# Patient Record
Sex: MALE | Race: WHITE | Employment: FULL TIME | URBAN - METROPOLITAN AREA
[De-identification: names, ages, dates, MRNs, and addresses within clinical notes are randomized per-mention and may not be internally consistent; named-entity substitution may affect disease eponyms.]

---

## 2018-08-28 ENCOUNTER — EKG ENCOUNTER (OUTPATIENT)
Dept: LAB | Facility: HOSPITAL | Age: 63
End: 2018-08-28
Attending: PLASTIC SURGERY
Payer: COMMERCIAL

## 2018-08-28 DIAGNOSIS — C44.319 BASAL CELL CARCINOMA OF FOREHEAD: Primary | ICD-10-CM

## 2018-08-28 LAB
ANION GAP SERPL CALC-SCNC: 5 MMOL/L (ref 0–18)
ATRIAL RATE: 65 BPM
BASOPHILS # BLD AUTO: 0.08 X10(3) UL (ref 0–0.1)
BASOPHILS NFR BLD AUTO: 1 %
BUN BLD-MCNC: 10 MG/DL (ref 8–20)
BUN/CREAT SERPL: 9.7 (ref 10–20)
CALCIUM BLD-MCNC: 9.1 MG/DL (ref 8.3–10.3)
CHLORIDE SERPL-SCNC: 105 MMOL/L (ref 101–111)
CO2 SERPL-SCNC: 31 MMOL/L (ref 22–32)
CREAT BLD-MCNC: 1.03 MG/DL (ref 0.7–1.3)
EOSINOPHIL # BLD AUTO: 0.79 X10(3) UL (ref 0–0.3)
EOSINOPHIL NFR BLD AUTO: 10.2 %
ERYTHROCYTE [DISTWIDTH] IN BLOOD BY AUTOMATED COUNT: 12.7 % (ref 11.5–16)
GLUCOSE BLD-MCNC: 101 MG/DL (ref 70–99)
HCT VFR BLD AUTO: 43.8 % (ref 37–53)
HGB BLD-MCNC: 15.1 G/DL (ref 13–17)
IMMATURE GRANULOCYTE COUNT: 0.01 X10(3) UL (ref 0–1)
IMMATURE GRANULOCYTE RATIO %: 0.1 %
LYMPHOCYTES # BLD AUTO: 2.69 X10(3) UL (ref 0.9–4)
LYMPHOCYTES NFR BLD AUTO: 34.8 %
MCH RBC QN AUTO: 31.1 PG (ref 27–33.2)
MCHC RBC AUTO-ENTMCNC: 34.5 G/DL (ref 31–37)
MCV RBC AUTO: 90.1 FL (ref 80–99)
MONOCYTES # BLD AUTO: 0.81 X10(3) UL (ref 0.1–1)
MONOCYTES NFR BLD AUTO: 10.5 %
NEUTROPHIL ABS PRELIM: 3.36 X10 (3) UL (ref 1.3–6.7)
NEUTROPHILS # BLD AUTO: 3.36 X10(3) UL (ref 1.3–6.7)
NEUTROPHILS NFR BLD AUTO: 43.4 %
OSMOLALITY SERPL CALC.SUM OF ELEC: 291 MOSM/KG (ref 275–295)
P AXIS: 45 DEGREES
P-R INTERVAL: 182 MS
PLATELET # BLD AUTO: 299 10(3)UL (ref 150–450)
POTASSIUM SERPL-SCNC: 4 MMOL/L (ref 3.6–5.1)
Q-T INTERVAL: 422 MS
QRS DURATION: 126 MS
QTC CALCULATION (BEZET): 438 MS
R AXIS: -15 DEGREES
RBC # BLD AUTO: 4.86 X10(6)UL (ref 4.3–5.7)
RED CELL DISTRIBUTION WIDTH-SD: 41.6 FL (ref 35.1–46.3)
SODIUM SERPL-SCNC: 141 MMOL/L (ref 136–144)
T AXIS: 24 DEGREES
VENTRICULAR RATE: 65 BPM
WBC # BLD AUTO: 7.7 X10(3) UL (ref 4–13)

## 2018-08-28 PROCEDURE — 85025 COMPLETE CBC W/AUTO DIFF WBC: CPT

## 2018-08-28 PROCEDURE — 93010 ELECTROCARDIOGRAM REPORT: CPT | Performed by: INTERNAL MEDICINE

## 2018-08-28 PROCEDURE — 80048 BASIC METABOLIC PNL TOTAL CA: CPT

## 2018-08-28 PROCEDURE — 93005 ELECTROCARDIOGRAM TRACING: CPT

## 2018-08-28 PROCEDURE — 36415 COLL VENOUS BLD VENIPUNCTURE: CPT

## 2019-07-26 ENCOUNTER — OFFICE VISIT (OUTPATIENT)
Dept: FAMILY MEDICINE CLINIC | Facility: CLINIC | Age: 64
End: 2019-07-26
Payer: COMMERCIAL

## 2019-07-26 VITALS
RESPIRATION RATE: 14 BRPM | TEMPERATURE: 98 F | HEART RATE: 62 BPM | BODY MASS INDEX: 29.82 KG/M2 | DIASTOLIC BLOOD PRESSURE: 88 MMHG | HEIGHT: 70.75 IN | WEIGHT: 213 LBS | SYSTOLIC BLOOD PRESSURE: 150 MMHG

## 2019-07-26 DIAGNOSIS — Z00.00 ANNUAL PHYSICAL EXAM: Primary | ICD-10-CM

## 2019-07-26 DIAGNOSIS — I10 ESSENTIAL HYPERTENSION: ICD-10-CM

## 2019-07-26 DIAGNOSIS — Z12.5 SCREENING FOR PROSTATE CANCER: ICD-10-CM

## 2019-07-26 DIAGNOSIS — Z00.00 LABORATORY EXAMINATION ORDERED AS PART OF A ROUTINE GENERAL MEDICAL EXAMINATION: ICD-10-CM

## 2019-07-26 DIAGNOSIS — E78.2 MIXED HYPERLIPIDEMIA: ICD-10-CM

## 2019-07-26 PROCEDURE — 90715 TDAP VACCINE 7 YRS/> IM: CPT | Performed by: FAMILY MEDICINE

## 2019-07-26 PROCEDURE — 99386 PREV VISIT NEW AGE 40-64: CPT | Performed by: FAMILY MEDICINE

## 2019-07-26 PROCEDURE — 90471 IMMUNIZATION ADMIN: CPT | Performed by: FAMILY MEDICINE

## 2019-07-26 NOTE — PROGRESS NOTES
Elena Ruelas is a 59year old male who presents for a complete physical exam.   HPI:   Patient presents with:  Physical      His last annual assessment has been over 1 year: Annual Physical due on 07/24/2014    Last office visit was 2013 and No results found for: A1C, VITD, PSA           No current outpatient medications on file prior to visit. No current facility-administered medications on file prior to visit. Past History:   He does not have any pertinent problems on file.    He  ha Normocephalic and atraumatic.    Right Ear: Tympanic membrane, external ear and ear canal normal.   Left Ear: Tympanic membrane, external ear and ear canal normal.   Nose: Nose normal.   Mouth/Throat: Uvula is midline, oropharynx is clear and moist and muco content normal. Cognition and memory are normal.       ASSESSMENT AND PLAN:   Rasta Sanchez III is a 59year old male who presents for a complete physical exam. Pt's weight is Body mass index is 29.92 kg/m². , recommended low fat diet and aerobic recheck HTN.     Stephany Powers MD, 7/26/2019, 2:56 PM

## 2019-08-21 ENCOUNTER — OFFICE VISIT (OUTPATIENT)
Dept: FAMILY MEDICINE CLINIC | Facility: CLINIC | Age: 64
End: 2019-08-21
Payer: COMMERCIAL

## 2019-08-21 VITALS
DIASTOLIC BLOOD PRESSURE: 90 MMHG | SYSTOLIC BLOOD PRESSURE: 160 MMHG | HEART RATE: 63 BPM | RESPIRATION RATE: 16 BRPM | WEIGHT: 209 LBS | BODY MASS INDEX: 29 KG/M2 | TEMPERATURE: 98 F

## 2019-08-21 DIAGNOSIS — M25.551 CHRONIC PAIN OF BOTH HIPS: ICD-10-CM

## 2019-08-21 DIAGNOSIS — M25.512 CHRONIC PAIN OF BOTH SHOULDERS: Primary | ICD-10-CM

## 2019-08-21 DIAGNOSIS — G89.29 CHRONIC PAIN OF BOTH SHOULDERS: Primary | ICD-10-CM

## 2019-08-21 DIAGNOSIS — G89.29 CHRONIC PAIN OF BOTH HIPS: ICD-10-CM

## 2019-08-21 DIAGNOSIS — M25.552 CHRONIC PAIN OF BOTH HIPS: ICD-10-CM

## 2019-08-21 DIAGNOSIS — M25.511 CHRONIC PAIN OF BOTH SHOULDERS: Primary | ICD-10-CM

## 2019-08-21 PROCEDURE — 99214 OFFICE O/P EST MOD 30 MIN: CPT | Performed by: PHYSICIAN ASSISTANT

## 2019-08-23 NOTE — PROGRESS NOTES
Patient presents with:  Shoulder Pain: both hips, 1-2 months       HISTORY OF PRESENT ILLNESS  Kraig Gonzalez III is a 59year old male who presents for evaluation of shoulder and hip pain.  He initially felt that the right shoulder was the problem rotation - normal. External rotation - abnormal: painful, normal range. Neers and Mckay impingement signs - Negative .    Cross-body, Obrians testing - Negative    Iris's test for supraspinatus - Negative   External rotation against resistance, infraspi

## 2019-08-27 ENCOUNTER — TELEPHONE (OUTPATIENT)
Dept: FAMILY MEDICINE CLINIC | Facility: CLINIC | Age: 64
End: 2019-08-27

## 2019-08-27 DIAGNOSIS — Z12.5 SCREENING PSA (PROSTATE SPECIFIC ANTIGEN): Primary | ICD-10-CM

## 2019-08-27 NOTE — TELEPHONE ENCOUNTER
Patient's PSA is sent to [facility], patient needs order changed to [facility]

## 2019-08-29 LAB
ABSOLUTE BASOPHILS: 80 CELLS/UL (ref 0–200)
ABSOLUTE EOSINOPHILS: 817 CELLS/UL (ref 15–500)
ABSOLUTE LYMPHOCYTES: 2117 CELLS/UL (ref 850–3900)
ABSOLUTE MONOCYTES: 690 CELLS/UL (ref 200–950)
ABSOLUTE NEUTROPHILS: 2995 CELLS/UL (ref 1500–7800)
ALBUMIN/GLOBULIN RATIO: 1.2 (CALC) (ref 1–2.5)
ALBUMIN: 4.1 G/DL (ref 3.6–5.1)
ALKALINE PHOSPHATASE: 85 U/L (ref 40–115)
ALT: 22 U/L (ref 9–46)
ANA SCREEN, IFA: NEGATIVE
AST: 24 U/L (ref 10–35)
BASOPHILS: 1.2 %
BILIRUBIN, TOTAL: 0.5 MG/DL (ref 0.2–1.2)
BUN: 16 MG/DL (ref 7–25)
C-REACTIVE PROTEIN: 7.4 MG/L
CALCIUM: 9.7 MG/DL (ref 8.6–10.3)
CARBON DIOXIDE: 28 MMOL/L (ref 20–32)
CHLORIDE: 102 MMOL/L (ref 98–110)
CHOL/HDLC RATIO: 3.7 (CALC)
CHOLESTEROL, TOTAL: 178 MG/DL
CREATININE: 0.9 MG/DL (ref 0.7–1.25)
EGFR IF AFRICN AM: 104 ML/MIN/1.73M2
EGFR IF NONAFRICN AM: 90 ML/MIN/1.73M2
EOSINOPHILS: 12.2 %
GLOBULIN: 3.4 G/DL (CALC) (ref 1.9–3.7)
GLUCOSE: 90 MG/DL (ref 65–99)
HDL CHOLESTEROL: 48 MG/DL
HEMATOCRIT: 40.3 % (ref 38.5–50)
HEMOGLOBIN: 13.8 G/DL (ref 13.2–17.1)
LDL-CHOLESTEROL: 114 MG/DL (CALC)
LYMPHOCYTES: 31.6 %
MCH: 30.7 PG (ref 27–33)
MCHC: 34.2 G/DL (ref 32–36)
MCV: 89.6 FL (ref 80–100)
MONOCYTES: 10.3 %
MPV: 10.3 FL (ref 7.5–12.5)
NEUTROPHILS: 44.7 %
NON-HDL CHOLESTEROL: 130 MG/DL (CALC)
PLATELET COUNT: 346 THOUSAND/UL (ref 140–400)
POTASSIUM: 4.5 MMOL/L (ref 3.5–5.3)
PROTEIN, TOTAL: 7.5 G/DL (ref 6.1–8.1)
PSA, TOTAL: 1.9 NG/ML
RDW: 12.3 % (ref 11–15)
RED BLOOD CELL COUNT: 4.5 MILLION/UL (ref 4.2–5.8)
RHEUMATOID FACTOR: <14 IU/ML
SED RATE BY MODIFIED$WESTERGREN: 34 MM/H
SODIUM: 136 MMOL/L (ref 135–146)
TRIGLYCERIDES: 68 MG/DL
TSH W/REFLEX TO FT4: 2.99 MIU/L (ref 0.4–4.5)
WHITE BLOOD CELL COUNT: 6.7 THOUSAND/UL (ref 3.8–10.8)

## 2019-09-03 ENCOUNTER — TELEPHONE (OUTPATIENT)
Dept: FAMILY MEDICINE CLINIC | Facility: CLINIC | Age: 64
End: 2019-09-03

## 2019-09-03 NOTE — TELEPHONE ENCOUNTER
Dinorah from [facility] is calling for Dr. Yodit Huston, they are requiring labs and any relevant notes for the patient sent to them fax number is:   [phone]    Prior to his appt tomorrow.

## 2019-09-16 ENCOUNTER — LAB ENCOUNTER (OUTPATIENT)
Dept: LAB | Age: 64
End: 2019-09-16
Attending: INTERNAL MEDICINE
Payer: COMMERCIAL

## 2019-09-16 ENCOUNTER — OFFICE VISIT (OUTPATIENT)
Dept: RHEUMATOLOGY | Facility: CLINIC | Age: 64
End: 2019-09-16
Payer: COMMERCIAL

## 2019-09-16 VITALS
RESPIRATION RATE: 16 BRPM | BODY MASS INDEX: 29.12 KG/M2 | HEART RATE: 69 BPM | DIASTOLIC BLOOD PRESSURE: 72 MMHG | SYSTOLIC BLOOD PRESSURE: 136 MMHG | HEIGHT: 70.75 IN | WEIGHT: 208 LBS | OXYGEN SATURATION: 98 %

## 2019-09-16 DIAGNOSIS — R70.0 ESR RAISED: ICD-10-CM

## 2019-09-16 DIAGNOSIS — M25.512 CHRONIC PAIN OF BOTH SHOULDERS: ICD-10-CM

## 2019-09-16 DIAGNOSIS — M25.50 POLYARTHRALGIA: Primary | ICD-10-CM

## 2019-09-16 DIAGNOSIS — M25.511 CHRONIC PAIN OF BOTH SHOULDERS: ICD-10-CM

## 2019-09-16 DIAGNOSIS — G89.29 CHRONIC PAIN OF BOTH SHOULDERS: ICD-10-CM

## 2019-09-16 LAB
HBV CORE AB SERPL QL IA: NONREACTIVE
HBV SURFACE AB SER QL: NONREACTIVE
HBV SURFACE AB SERPL IA-ACNC: <3.1 MIU/ML
HBV SURFACE AG SER-ACNC: <0.1 [IU]/L
HBV SURFACE AG SERPL QL IA: NONREACTIVE
HCV AB SERPL QL IA: NONREACTIVE
URATE SERPL-MCNC: 5.6 MG/DL (ref 3.5–7.2)

## 2019-09-16 PROCEDURE — 86803 HEPATITIS C AB TEST: CPT | Performed by: INTERNAL MEDICINE

## 2019-09-16 PROCEDURE — 86200 CCP ANTIBODY: CPT | Performed by: INTERNAL MEDICINE

## 2019-09-16 PROCEDURE — 86480 TB TEST CELL IMMUN MEASURE: CPT

## 2019-09-16 PROCEDURE — 86704 HEP B CORE ANTIBODY TOTAL: CPT | Performed by: INTERNAL MEDICINE

## 2019-09-16 PROCEDURE — 99244 OFF/OP CNSLTJ NEW/EST MOD 40: CPT | Performed by: INTERNAL MEDICINE

## 2019-09-16 PROCEDURE — 86706 HEP B SURFACE ANTIBODY: CPT

## 2019-09-16 PROCEDURE — 87340 HEPATITIS B SURFACE AG IA: CPT | Performed by: INTERNAL MEDICINE

## 2019-09-16 PROCEDURE — 86812 HLA TYPING A B OR C: CPT

## 2019-09-16 PROCEDURE — 84550 ASSAY OF BLOOD/URIC ACID: CPT | Performed by: INTERNAL MEDICINE

## 2019-09-16 PROCEDURE — 36415 COLL VENOUS BLD VENIPUNCTURE: CPT

## 2019-09-16 PROCEDURE — 82955 ASSAY OF G6PD ENZYME: CPT

## 2019-09-16 RX ORDER — PREDNISONE 10 MG/1
20 TABLET ORAL DAILY
Qty: 60 TABLET | Refills: 0 | Status: SHIPPED | OUTPATIENT
Start: 2019-09-16 | End: 2019-11-22 | Stop reason: ALTCHOICE

## 2019-09-16 NOTE — PATIENT INSTRUCTIONS
You were seen today for joint pain in shoulders, hips and R wrist  It seems to be from Rheumatoid arthritis but will see what the blood work shows  Plan to start prednisone 20 mg daily for 4 days then go down to 10 mg daily for 4 days then stay on 5 mg shashank

## 2019-09-16 NOTE — PROGRESS NOTES
Carlos Castillo is a 59year old male who presents for Patient presents with:  Consult: Athralgia  Abnormal Labs: pt c/o shoulders, knees, wrist, hip pain  .    HPI:   CC: polyarthralgia  Consult: referred by PCP Dr. Maciej House    This is a 60 yo M wi Family History   Problem Relation Age of Onset   • Cancer Mother         Brain   • Other (Glioblastoma Multiforme (Grade IV) of the brain) Mother    • Cancer Maternal Grandmother         Colon   • Other (Family Hx Phenylketonuria) Other    • Other ([facility]  SKIN: No rashes or skin lesions. No nail findings  MSK:  Cervical spine: FROM  Hands: no synovitis in DIP, PIP and MCP, strong full fists.  L 3-5th finger amputations  Wrist: R wrist chronic synovitis, limited extension and flexion of wrists  Elbow: FR % 12.2   BASOPHILS      % 1.2   GLUCOSE      65 - 99 mg/dL 90   BUN      7 - 25 mg/dL 16   CREATININE      0.70 - 1.25 mg/dL 0.90   eGFR NON-AFR.  AMERICAN      > OR = 60 mL/min/1.73m2 90   eGFR AFRICAN AMERICAN      > OR = 60 mL/min/1.73m2 104   Bun/Crea pain and hips.   All improved since starting Celebrex except the right shoulder.  - Also found to have elevated ESR of 34  - Plan to obtain further blood work consisting of CCP, HLA-B27, hepatitis B, C and QuantiFERON TB for possibility of starting on DMARD

## 2019-09-18 LAB
CCP IGG SERPL-ACNC: 1.6 U/ML (ref 0–6.9)
M TB IFN-G CD4+ T-CELLS BLD-ACNC: 0.04 IU/ML
M TB TUBERC IFN-G BLD QL: NEGATIVE
M TB TUBERC IGNF/MITOGEN IGNF CONTROL: >10 IU/ML
QUANTIFERON TB1 MINUS NIL: 0.01 IU/ML
QUANTIFERON TB2 MINUS NIL: 0 IU/ML

## 2019-09-19 LAB
GLUCOSE-6-PHOSPHATE DEHYDROGENASE: 12.3 U/G HB
HLA-B27: NEGATIVE

## 2019-09-30 ENCOUNTER — OFFICE VISIT (OUTPATIENT)
Dept: RHEUMATOLOGY | Facility: CLINIC | Age: 64
End: 2019-09-30
Payer: COMMERCIAL

## 2019-09-30 ENCOUNTER — PATIENT MESSAGE (OUTPATIENT)
Dept: RHEUMATOLOGY | Facility: CLINIC | Age: 64
End: 2019-09-30

## 2019-09-30 VITALS
HEART RATE: 65 BPM | SYSTOLIC BLOOD PRESSURE: 132 MMHG | DIASTOLIC BLOOD PRESSURE: 80 MMHG | BODY MASS INDEX: 29.78 KG/M2 | OXYGEN SATURATION: 98 % | HEIGHT: 70 IN | WEIGHT: 208 LBS | RESPIRATION RATE: 16 BRPM

## 2019-09-30 DIAGNOSIS — M25.511 CHRONIC PAIN OF BOTH SHOULDERS: ICD-10-CM

## 2019-09-30 DIAGNOSIS — M25.512 CHRONIC PAIN OF BOTH SHOULDERS: ICD-10-CM

## 2019-09-30 DIAGNOSIS — G89.29 CHRONIC PAIN OF BOTH SHOULDERS: ICD-10-CM

## 2019-09-30 DIAGNOSIS — M19.90 INFLAMMATORY ARTHRITIS: Primary | ICD-10-CM

## 2019-09-30 DIAGNOSIS — R70.0 ESR RAISED: ICD-10-CM

## 2019-09-30 PROCEDURE — 99214 OFFICE O/P EST MOD 30 MIN: CPT | Performed by: INTERNAL MEDICINE

## 2019-09-30 RX ORDER — FOLIC ACID 1 MG/1
1 TABLET ORAL DAILY
Qty: 90 TABLET | Refills: 0 | Status: SHIPPED | OUTPATIENT
Start: 2019-09-30 | End: 2019-12-31

## 2019-09-30 RX ORDER — METHOTREXATE 2.5 MG/1
15 TABLET ORAL WEEKLY
Qty: 80 TABLET | Refills: 0 | Status: SHIPPED | OUTPATIENT
Start: 2019-09-30 | End: 2019-11-14

## 2019-09-30 NOTE — PROGRESS NOTES
Pancho Cleary is a 59year old male. HPI:   Patient presents with: Follow - Up: Polyarthralgia  Lab Results  Medication Follow-Up      I had the pleasure of seeing Joaquín Langley III on 9/30/2019 for follow up.      Current Medica showing negative RF, CCP and HLA-B27. Found to have elevated ESR of around 33  Symptoms appear to be consistent with seronegative RA. HISTORY:  History reviewed. No pertinent past medical history.    Social Hx Reviewed   Family Hx Reviewed     Medicatio CELL COUNT      4.20 - 5.80 Million/uL 4.50   Hemoglobin      13.2 - 17.1 g/dL 13.8   Hematocrit      38.5 - 50.0 % 40.3   MCV      80.0 - 100.0 fL 89.6   MCH      27.0 - 33.0 pg 30.7   MCHC      32.0 - 36.0 g/dL 34.2   RDW      11.0 - 15.0 % 12.3   Platel mIU/mL <3.10   HCV AB      Nonreactive  Nonreactive   HEPATITIS B CORE AB, TOTAL      Nonreactive  Nonreactive   GLUCOSE-6-PHOSPHATE DEHYDROGEN      9.9 - 16.6 U/g Hb 12.3   HLA-B27      Negative Negative     Component      Latest Ref Rng & Units 9/16/2019 MTX d/w patient which include: nausea and vomiting, fatigue, oral ulcers, hepatic toxicity, pancytopenia, increased risk of infections, pneumonitis, flu-like symptoms, lymphoma, teratogenic. Pt should avoid alcohol use.    - Labs will need to monitored ever

## 2019-09-30 NOTE — PATIENT INSTRUCTIONS
You were seen today for RA   Plan to start methotrexate 4 pills weekly for 2 week then increase to 6 pills weekly  Cont folic acid daily  For the predniosne take 5 mg daily for 2 weeks then 2.5 mg daily for 2 weeks then 2.5 mg every other day for 2 weeks t

## 2019-10-01 NOTE — TELEPHONE ENCOUNTER
From: Jett Nunez III  To: Maggi Huddleston MD  Sent: 9/30/2019 6:22 PM CDT  Subject: Visit Follow-up Question    After doing a little research, I see that I need to ask you about the Shingles vaccine. My second dose is due in October.  Should I co

## 2019-10-02 NOTE — TELEPHONE ENCOUNTER
Is the shingles shot that he is getting a live vaccination?    - if live vaccination, get the shot then start the MTX  - if inactivated vaccine then can get the shot and start the MTX now    Dr. Galindo Mancuso

## 2019-10-02 NOTE — TELEPHONE ENCOUNTER
There is no issue with getting Shingrix while he is on methotrexate because it is not a live vaccination. Once he is tapered off prednisone he can get the Shingrix vaccination. There is also no issue getting the flu shot while on methotrexate.

## 2019-11-14 ENCOUNTER — TELEPHONE (OUTPATIENT)
Dept: RHEUMATOLOGY | Facility: CLINIC | Age: 64
End: 2019-11-14

## 2019-11-14 ENCOUNTER — OFFICE VISIT (OUTPATIENT)
Dept: RHEUMATOLOGY | Facility: CLINIC | Age: 64
End: 2019-11-14
Payer: COMMERCIAL

## 2019-11-14 VITALS
BODY MASS INDEX: 28.63 KG/M2 | WEIGHT: 200 LBS | RESPIRATION RATE: 18 BRPM | DIASTOLIC BLOOD PRESSURE: 84 MMHG | SYSTOLIC BLOOD PRESSURE: 136 MMHG | HEIGHT: 70 IN | HEART RATE: 57 BPM

## 2019-11-14 DIAGNOSIS — Z51.81 THERAPEUTIC DRUG MONITORING: ICD-10-CM

## 2019-11-14 DIAGNOSIS — M19.90 INFLAMMATORY ARTHRITIS: Primary | ICD-10-CM

## 2019-11-14 DIAGNOSIS — M25.511 CHRONIC PAIN OF BOTH SHOULDERS: ICD-10-CM

## 2019-11-14 DIAGNOSIS — R70.0 ESR RAISED: ICD-10-CM

## 2019-11-14 DIAGNOSIS — M25.512 CHRONIC PAIN OF BOTH SHOULDERS: ICD-10-CM

## 2019-11-14 DIAGNOSIS — G89.29 CHRONIC PAIN OF BOTH SHOULDERS: ICD-10-CM

## 2019-11-14 PROCEDURE — 99214 OFFICE O/P EST MOD 30 MIN: CPT | Performed by: INTERNAL MEDICINE

## 2019-11-14 RX ORDER — METHOTREXATE 2.5 MG/1
15 TABLET ORAL WEEKLY
Qty: 80 TABLET | Refills: 0 | Status: SHIPPED | OUTPATIENT
Start: 2019-11-14 | End: 2020-02-16

## 2019-11-14 NOTE — PATIENT INSTRUCTIONS
You were seen today for seronegative RA or PMR  Inflammatory markers are normal now  Let me know how the R shoulder is doing in a month  Blood work in 3 mos   Cont MTX 6 pills for now

## 2019-11-14 NOTE — PROGRESS NOTES
Sy Rivero is a 59year old male. HPI:   Patient presents with: Follow - Up  Test Results      I had the pleasure of seeing Demetrius Barraza III on 11/14/2019 for follow up Seronegative RA.      Current Medications:  MTX 6 pills resolved        HISTORY:  No past medical history on file.    Social Hx Reviewed   Family Hx Reviewed     Medications (Active prior to today's visit):  Current Outpatient Medications   Medication Sig Dispense Refill   • Methotrexate Sodium 2.5 MG Oral Tab T CELL COUNT      3.8 - 10.8 Thousand/uL 6.7   RED BLOOD CELL COUNT      4.20 - 5.80 Million/uL 4.50   Hemoglobin      13.2 - 17.1 g/dL 13.8   Hematocrit      38.5 - 50.0 % 40.3   MCV      80.0 - 100.0 fL 89.6   MCH      27.0 - 33.0 pg 30.7   MCHC      32.0 Nonreactive  Nonreactive   HEPATITIS B SURFACE AB QUANT      mIU/mL <3.10   HCV AB      Nonreactive  Nonreactive   HEPATITIS B CORE AB, TOTAL      Nonreactive  Nonreactive   GLUCOSE-6-PHOSPHATE DEHYDROGEN      9.9 - 16.6 U/g Hb 12.3   HLA-B27      Negative continues to have pain in R shoulder/deltoid will order MRI before 2020  - blood work in 3 mos    Pt will f/u in 3 mos    Gonzalez Santana MD  11/14/2019   4:01 PM

## 2019-11-14 NOTE — TELEPHONE ENCOUNTER
Per patient he had test in [employer] Tel# [phone]  Monday 11/11/2019 and would like to know if received, patient has an appointment today at [facility]. Please call patient as soon as possible.

## 2019-11-14 NOTE — TELEPHONE ENCOUNTER
Contacted [employer] and received pt's labs from 11/11/2019. Results given to Dr. Nancy Cancino for review. LM informing pt labs were received and will be reviewed during today's office visit.      Future Appointments   Date Time Provider Michelle Barrios   11/14/2019

## 2019-11-14 NOTE — TELEPHONE ENCOUNTER
Patient is calling and would like a call back asap at [phone]. Please, inform the patient if his results were received.

## 2019-11-21 RX ORDER — DIAZEPAM 5 MG/1
TABLET ORAL
Refills: 0 | COMMUNITY
Start: 2019-09-03 | End: 2020-06-29 | Stop reason: ALTCHOICE

## 2019-11-22 ENCOUNTER — OFFICE VISIT (OUTPATIENT)
Dept: FAMILY MEDICINE CLINIC | Facility: CLINIC | Age: 64
End: 2019-11-22
Payer: COMMERCIAL

## 2019-11-22 VITALS
WEIGHT: 200 LBS | SYSTOLIC BLOOD PRESSURE: 130 MMHG | BODY MASS INDEX: 28 KG/M2 | HEIGHT: 70.75 IN | DIASTOLIC BLOOD PRESSURE: 70 MMHG | RESPIRATION RATE: 16 BRPM | HEART RATE: 60 BPM | TEMPERATURE: 98 F

## 2019-11-22 DIAGNOSIS — E78.2 MIXED HYPERLIPIDEMIA: Primary | ICD-10-CM

## 2019-11-22 DIAGNOSIS — Z23 NEED FOR IMMUNIZATION AGAINST INFLUENZA: ICD-10-CM

## 2019-11-22 DIAGNOSIS — Z13.1 SCREENING FOR DIABETES MELLITUS: ICD-10-CM

## 2019-11-22 PROCEDURE — 90471 IMMUNIZATION ADMIN: CPT | Performed by: FAMILY MEDICINE

## 2019-11-22 PROCEDURE — 99213 OFFICE O/P EST LOW 20 MIN: CPT | Performed by: FAMILY MEDICINE

## 2019-11-22 PROCEDURE — 90686 IIV4 VACC NO PRSV 0.5 ML IM: CPT | Performed by: FAMILY MEDICINE

## 2019-11-22 RX ORDER — ATORVASTATIN CALCIUM 10 MG/1
10 TABLET, FILM COATED ORAL DAILY
Qty: 90 TABLET | Refills: 3 | Status: SHIPPED | OUTPATIENT
Start: 2019-11-22 | End: 2020-02-12

## 2019-11-22 NOTE — PROGRESS NOTES
HPI:   Patient presents with:  Blood Pressure    Subjective   Ramos Angel is a 59year old male who presents for recheck of his hypertension. He has been taking medications as instructed, with no medication side effects.  His home BP monitor noted.   PLAN: will continue present medications, reviewed diet, exercise and weight control, Start atorvastatin and reassess in the near future, and labs as ordered      Problem List Items Addressed This Visit        Cardiovascular    Mixed hyperlipidemia

## 2019-12-12 ENCOUNTER — TELEPHONE (OUTPATIENT)
Dept: RHEUMATOLOGY | Facility: CLINIC | Age: 64
End: 2019-12-12

## 2019-12-12 ENCOUNTER — PATIENT MESSAGE (OUTPATIENT)
Dept: RHEUMATOLOGY | Facility: CLINIC | Age: 64
End: 2019-12-12

## 2019-12-12 NOTE — TELEPHONE ENCOUNTER
Please see pt's message and advise. Acute message sent regarding symptoms after Shingles vaccine. Shoulder xray results: RIGHT ACROMIOCLAVICULAR OSTEOARTHRITIS. Should pt proceed with MRI as ordered? No need to limit caffeine intake? Please advise.

## 2019-12-12 NOTE — TELEPHONE ENCOUNTER
Patient states he received his shingles shot yesterday and ever since then he has been feeling not good. States he has a low grade fever, aches all over, and feeling less energized. Transferring to triage.

## 2019-12-12 NOTE — TELEPHONE ENCOUNTER
From: Zeyad Adams III  To: Marisa Dsouza MD  Sent: 12/12/2019 8:10 AM CST  Subject: Prescription Question    I received my second Shingles shot yesterday and I am feeling feverish.  Is it ok for me to take Ibuprofen or something else to relieve t

## 2019-12-12 NOTE — TELEPHONE ENCOUNTER
Spoke with pt and informed of recommendations below, pt verbalized understanding and is agreeable with plan.

## 2019-12-12 NOTE — TELEPHONE ENCOUNTER
Please see message below. Pt received 2nd dose of Shingles vaccine yesterday. Today he is experiencing generalized body aches, fatigue, chills, and feels like he has a low-grade fever. Pt has not checked body temperature, will check when he gets home.  Venancio Giles

## 2019-12-12 NOTE — TELEPHONE ENCOUNTER
Yes he can take motrin or tylenol if needed. Coffee is okay in moderation, does not really affect mtx.  He can proceed with the MRI

## 2019-12-12 NOTE — TELEPHONE ENCOUNTER
Contacted [employer] in managed care - [place] for MRI has been started and pt will be contacted with outcome.

## 2019-12-13 ENCOUNTER — PATIENT MESSAGE (OUTPATIENT)
Dept: RHEUMATOLOGY | Facility: CLINIC | Age: 64
End: 2019-12-13

## 2019-12-16 NOTE — TELEPHONE ENCOUNTER
From: Zane Brunson III  To: Karen Montiel MD  Sent: 12/13/2019 9:39 PM CST  Subject: Test Results Question    I have a question about X-Ray Shoulder resulted on 12/11/19 at 1:06 PM.    The comments on the interstitial prominence in the lung is concerni

## 2019-12-16 NOTE — TELEPHONE ENCOUNTER
The XR was not for the lung, but interstitial prominence is a vague findings, just can mean increased markings or scarring or a recent upper respiratory infection.  As long as you dont have any symptoms would not recommend a cxr

## 2019-12-28 ENCOUNTER — PATIENT MESSAGE (OUTPATIENT)
Dept: RHEUMATOLOGY | Facility: CLINIC | Age: 64
End: 2019-12-28

## 2019-12-30 NOTE — TELEPHONE ENCOUNTER
From: Venkatesh Benoit III  To: Jamie Davenport MD  Sent: 12/28/2019 9:51 PM CST  Subject: Prescription Question    How long before my MRI should I take the medication you prescribed?      Thanks     Michel Jama

## 2019-12-31 ENCOUNTER — HOSPITAL ENCOUNTER (OUTPATIENT)
Dept: MRI IMAGING | Age: 64
Discharge: HOME | End: 2019-12-31
Attending: INTERNAL MEDICINE
Payer: COMMERCIAL

## 2019-12-31 DIAGNOSIS — M25.511 CHRONIC RIGHT SHOULDER PAIN: ICD-10-CM

## 2019-12-31 DIAGNOSIS — G89.29 CHRONIC RIGHT SHOULDER PAIN: ICD-10-CM

## 2019-12-31 DIAGNOSIS — M19.90 INFLAMMATORY ARTHRITIS: ICD-10-CM

## 2019-12-31 LAB — CREAT BLD-MCNC: 0.9 MG/DL (ref 0.7–1.3)

## 2019-12-31 PROCEDURE — A9575 INJ GADOTERATE MEGLUMI 0.1ML: HCPCS | Performed by: INTERNAL MEDICINE

## 2019-12-31 PROCEDURE — 82565 ASSAY OF CREATININE: CPT

## 2019-12-31 PROCEDURE — 73223 MRI JOINT UPR EXTR W/O&W/DYE: CPT | Performed by: INTERNAL MEDICINE

## 2019-12-31 RX ORDER — FOLIC ACID 1 MG/1
1 TABLET ORAL DAILY
Qty: 90 TABLET | Refills: 3 | Status: SHIPPED | OUTPATIENT
Start: 2019-12-31 | End: 2020-02-12

## 2019-12-31 NOTE — TELEPHONE ENCOUNTER
LOV: 11/14/2019  Future Appointments   Date Time Provider Michelle Barrios   2/20/2020  4:00 PM Omari Contreras MD [facility]   4/3/2020  3:45 PM Eneida Holcomb MD [facility] [address]   7/27/2020  4:30 PM Henrietta Duarte MD [facility]

## 2019-12-31 NOTE — TELEPHONE ENCOUNTER
Patient is requesting a refill on folic acid 1 MG Oral Tab and sent to [facility] in [place] on [address]. Patient would like this filled today.

## 2020-01-03 ENCOUNTER — TELEPHONE (OUTPATIENT)
Dept: RHEUMATOLOGY | Facility: CLINIC | Age: 65
End: 2020-01-03

## 2020-01-06 NOTE — TELEPHONE ENCOUNTER
Pt given appointment for 1/13 at 4:40pm in [facility] for right shoulder injection.

## 2020-01-13 ENCOUNTER — OFFICE VISIT (OUTPATIENT)
Dept: RHEUMATOLOGY | Facility: CLINIC | Age: 65
End: 2020-01-13
Payer: COMMERCIAL

## 2020-01-13 VITALS
WEIGHT: 197 LBS | DIASTOLIC BLOOD PRESSURE: 78 MMHG | HEIGHT: 70.75 IN | SYSTOLIC BLOOD PRESSURE: 134 MMHG | HEART RATE: 65 BPM | BODY MASS INDEX: 27.58 KG/M2

## 2020-01-13 DIAGNOSIS — Z51.81 THERAPEUTIC DRUG MONITORING: ICD-10-CM

## 2020-01-13 DIAGNOSIS — M19.90 INFLAMMATORY ARTHRITIS: ICD-10-CM

## 2020-01-13 DIAGNOSIS — M77.8 RIGHT SHOULDER TENDONITIS: Primary | ICD-10-CM

## 2020-01-13 PROCEDURE — 20610 DRAIN/INJ JOINT/BURSA W/O US: CPT | Performed by: INTERNAL MEDICINE

## 2020-01-13 PROCEDURE — 99214 OFFICE O/P EST MOD 30 MIN: CPT | Performed by: INTERNAL MEDICINE

## 2020-01-13 RX ORDER — TRIAMCINOLONE ACETONIDE 40 MG/ML
40 INJECTION, SUSPENSION INTRA-ARTICULAR; INTRAMUSCULAR ONCE
Status: COMPLETED | OUTPATIENT
Start: 2020-01-13 | End: 2020-01-13

## 2020-01-13 RX ADMIN — TRIAMCINOLONE ACETONIDE 40 MG: 40 INJECTION, SUSPENSION INTRA-ARTICULAR; INTRAMUSCULAR at 15:46:00

## 2020-01-13 NOTE — PATIENT INSTRUCTIONS
You are seen today for your right shoulder pain  We injected it with Kenalog today  Plan to send you to physical therapy  For your RA, continue methotrexate 6 pills weekly  Get blood work next month

## 2020-01-13 NOTE — PROCEDURES
With paitent's consent, I injected pt's R shoulder with 1ml lidocaine 1 % and 1 ml kenalog 40. It was done under sterile technique using iodine and alcohol swabs and ethyl chloride was used as an anaesthetic spray. Pt.  tolerated it well.

## 2020-01-15 ENCOUNTER — PATIENT MESSAGE (OUTPATIENT)
Dept: RHEUMATOLOGY | Facility: CLINIC | Age: 65
End: 2020-01-15

## 2020-01-15 NOTE — TELEPHONE ENCOUNTER
From: Jocelynn Grimaldo III  To: Britt Simmons MD  Sent: 1/15/2020 8:35 AM CST  Subject: Visit Cheli Wang,     Thank you for the treatment on Monday. Things are much better!     For the physical therapy, I have decided to use

## 2020-01-16 ENCOUNTER — PATIENT MESSAGE (OUTPATIENT)
Dept: RHEUMATOLOGY | Facility: CLINIC | Age: 65
End: 2020-01-16

## 2020-01-16 NOTE — TELEPHONE ENCOUNTER
From: Ryan Moon III  To: Norman Murphy MD  Sent: 1/16/2020 10:27 AM CST  Subject: Visit Montez Evan Dr. Matha Sandifer to my previous message, were you able to send the fax requested for my physical therapy at [address] [place] [address]

## 2020-02-12 ENCOUNTER — TELEPHONE (OUTPATIENT)
Dept: RHEUMATOLOGY | Facility: CLINIC | Age: 65
End: 2020-02-12

## 2020-02-12 DIAGNOSIS — M19.90 INFLAMMATORY ARTHRITIS: Primary | ICD-10-CM

## 2020-02-12 DIAGNOSIS — M19.90 INFLAMMATORY ARTHRITIS: ICD-10-CM

## 2020-02-12 DIAGNOSIS — Z51.81 ENCOUNTER FOR THERAPEUTIC DRUG MONITORING: ICD-10-CM

## 2020-02-12 RX ORDER — FOLIC ACID 1 MG/1
1 TABLET ORAL DAILY
Qty: 90 TABLET | Refills: 3 | Status: SHIPPED | OUTPATIENT
Start: 2020-02-12 | End: 2020-05-21

## 2020-02-12 RX ORDER — ATORVASTATIN CALCIUM 10 MG/1
10 TABLET, FILM COATED ORAL DAILY
Qty: 90 TABLET | Refills: 3 | OUTPATIENT
Start: 2020-02-12 | End: 2021-02-06

## 2020-02-12 RX ORDER — ATORVASTATIN CALCIUM 10 MG/1
10 TABLET, FILM COATED ORAL DAILY
Qty: 90 TABLET | Refills: 0 | Status: SHIPPED | OUTPATIENT
Start: 2020-02-12 | End: 2020-05-15

## 2020-02-12 NOTE — TELEPHONE ENCOUNTER
Last filled: 12/31/19 #90 tab with 3 refills  LOV: 1/13/2020  Future Appointments   Date Time Provider Michelle Sophie   2/20/2020  4:00 PM Yvonne Dow MD [facility]   4/3/2020  3:45 PM Maki Schmitt MD [facility] [address]   7/27/2020  4

## 2020-02-12 NOTE — TELEPHONE ENCOUNTER
Patient is requesting orders below:    -Follow up labs to [address] [place] [address]    He would like to have this done before his office visit on 2/20. Please call back with confirmation once approved.

## 2020-02-12 NOTE — TELEPHONE ENCOUNTER
Pt notified. Lab order faxed to [address] [place] [address] in [place] ([phone] [phone]) per pt's request. Fax # [phone].

## 2020-02-14 LAB
ABSOLUTE BASOPHILS: 93 CELLS/UL (ref 0–200)
ABSOLUTE EOSINOPHILS: 419 CELLS/UL (ref 15–500)
ABSOLUTE LYMPHOCYTES: 1535 CELLS/UL (ref 850–3900)
ABSOLUTE MONOCYTES: 874 CELLS/UL (ref 200–950)
ABSOLUTE NEUTROPHILS: 6380 CELLS/UL (ref 1500–7800)
ALBUMIN: 4.5 G/DL (ref 3.6–5.1)
ALT: 31 U/L (ref 9–46)
AST: 22 U/L (ref 10–35)
BASOPHILS: 1 %
C-REACTIVE PROTEIN: 1.3 MG/L
CREATININE: 1.02 MG/DL (ref 0.7–1.25)
EGFR IF AFRICN AM: 90 ML/MIN/1.73M2
EGFR IF NONAFRICN AM: 77 ML/MIN/1.73M2
EOSINOPHILS: 4.5 %
HEMATOCRIT: 40.9 % (ref 38.5–50)
HEMOGLOBIN: 14.5 G/DL (ref 13.2–17.1)
LYMPHOCYTES: 16.5 %
MCH: 31.3 PG (ref 27–33)
MCHC: 35.5 G/DL (ref 32–36)
MCV: 88.1 FL (ref 80–100)
MONOCYTES: 9.4 %
MPV: 10.7 FL (ref 7.5–12.5)
NEUTROPHILS: 68.6 %
PLATELET COUNT: 324 THOUSAND/UL (ref 140–400)
RDW: 13.1 % (ref 11–15)
RED BLOOD CELL COUNT: 4.64 MILLION/UL (ref 4.2–5.8)
SED RATE BY MODIFIED$WESTERGREN: 6 MM/H
WHITE BLOOD CELL COUNT: 9.3 THOUSAND/UL (ref 3.8–10.8)

## 2020-02-20 ENCOUNTER — OFFICE VISIT (OUTPATIENT)
Dept: RHEUMATOLOGY | Facility: CLINIC | Age: 65
End: 2020-02-20
Payer: COMMERCIAL

## 2020-02-20 VITALS
BODY MASS INDEX: 26.74 KG/M2 | WEIGHT: 191 LBS | HEART RATE: 57 BPM | DIASTOLIC BLOOD PRESSURE: 80 MMHG | HEIGHT: 70.75 IN | SYSTOLIC BLOOD PRESSURE: 136 MMHG

## 2020-02-20 DIAGNOSIS — M77.8 RIGHT SHOULDER TENDONITIS: ICD-10-CM

## 2020-02-20 DIAGNOSIS — M19.90 INFLAMMATORY ARTHRITIS: Primary | ICD-10-CM

## 2020-02-20 DIAGNOSIS — Z51.81 ENCOUNTER FOR THERAPEUTIC DRUG MONITORING: ICD-10-CM

## 2020-02-20 PROCEDURE — 99214 OFFICE O/P EST MOD 30 MIN: CPT | Performed by: INTERNAL MEDICINE

## 2020-02-20 NOTE — PROGRESS NOTES
Sy Rivero is a 59year old male. HPI:   Patient presents with:   Follow - Up: Pt states he feels better than LOV      I had the pleasure of seeing Demetrius Christi MORALES on 2/20/2020 for follow up Seronegative RA and R shoulder luli States that he is able to do his daily activities, throwing darts and even shave his face with his right hand which was limited before        HISTORY:  No past medical history on file.    Social Hx Reviewed   Family Hx Reviewed     Medications (Active prior COUNT      4.20 - 5.80 Million/uL 4.64   Hemoglobin      13.2 - 17.1 g/dL 14.5   Hematocrit      38.5 - 50.0 % 40.9   MCV      80.0 - 100.0 fL 88.1   MCH      27.0 - 33.0 pg 31.3   MCHC      32.0 - 36.0 g/dL 35.5   RDW      11.0 - 15.0 % 13.1   Platelet Co C-Citrullinated Peptide IgG AB      0.0 - 6.9 U/mL 1.6    URIC ACID      3.5 - 7.2 mg/dL 5.6      Imaging:     MRI R shoulder 12/2019:  Partial-thickness articular surface tears of the supraspinatus and infraspinatus with subacromial subdeltoid bursitis. seronegative RA.   He responded very well to prednisone   - cont MTX 6 pills weekly and FA daily  - blood work in May    R shoulder RTC tear, tendonitis and synovitis  - R shoulder injected with cortisone in [place] and currently in PT  - Reports significant im

## 2020-02-20 NOTE — PATIENT INSTRUCTIONS
You were seen today for RA today  Your blood work looked good  Blood work in 3 mos, in [facility]  Follow up in [facility]

## 2020-05-15 RX ORDER — ATORVASTATIN CALCIUM 10 MG/1
TABLET, FILM COATED ORAL
Qty: 90 TABLET | Refills: 0 | Status: SHIPPED | OUTPATIENT
Start: 2020-05-15 | End: 2020-08-11

## 2020-05-15 NOTE — TELEPHONE ENCOUNTER
Requested Prescriptions     Pending Prescriptions Disp Refills   • ATORVASTATIN 10 MG Oral Tab [Pharmacy Med Name: ATORVASTATIN 10MG TABLETS] 90 tablet 0     Sig: TAKE 1 TABLET(10 MG) BY MOUTH DAILY     LOV 11/22/2019         Appointment scheduled: 7/27/20

## 2020-05-21 ENCOUNTER — TELEMEDICINE (OUTPATIENT)
Dept: RHEUMATOLOGY | Facility: CLINIC | Age: 65
End: 2020-05-21

## 2020-05-21 DIAGNOSIS — Z51.81 THERAPEUTIC DRUG MONITORING: Primary | ICD-10-CM

## 2020-05-21 DIAGNOSIS — M19.90 INFLAMMATORY ARTHRITIS: ICD-10-CM

## 2020-05-21 PROCEDURE — 99213 OFFICE O/P EST LOW 20 MIN: CPT | Performed by: INTERNAL MEDICINE

## 2020-05-21 RX ORDER — FOLIC ACID 1 MG/1
1 TABLET ORAL DAILY
Qty: 90 TABLET | Refills: 3 | Status: SHIPPED | OUTPATIENT
Start: 2020-05-21 | End: 2021-04-07

## 2020-05-21 NOTE — PROGRESS NOTES
Ramos Angel is a 59year old male. HPI:   No chief complaint on file. This visit is conducted using Telemedicine with live, interactive video and audio.     Patient understands and accepts financial responsibility for any deductible, c resolved. R shoulder pain has resolved after cortisone injection      HISTORY:  No past medical history on file.    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   - Small perforation within the central TFC disc.     XR wrist 8/2019:  X-rays show decreased joint space in the right thumb CMC joint.  There is   also de

## 2020-05-21 NOTE — PATIENT INSTRUCTIONS
We discussed your RA symptoms  Cont MTX 6 pills weekly and folic acid daily, refills were sent to pharmacy  Blood work in august  Follow up in august

## 2020-06-26 ENCOUNTER — TELEPHONE (OUTPATIENT)
Dept: RHEUMATOLOGY | Facility: CLINIC | Age: 65
End: 2020-06-26

## 2020-06-26 NOTE — TELEPHONE ENCOUNTER
Patient would like to know if he can take ibuprofen for back pain with the methotrexate 2.5 MG Oral Tab.

## 2020-06-26 NOTE — TELEPHONE ENCOUNTER
Pt contacted and given provider's directions below. Pt verbalized understanding and agreeable with plan.

## 2020-06-26 NOTE — TELEPHONE ENCOUNTER
Please see request below regarding ibuprofen and advise. Dosing? ? Component      Latest Ref Rng & Units 5/15/2020   CREATININE      0.70 - 1.25 mg/dL 0.90   eGFR NON-AFR.  AMERICAN      > OR = 60 mL/min/1.73m2 90   eGFR AFRICAN AMERICAN      > OR = 60 mL

## 2020-06-29 ENCOUNTER — OFFICE VISIT (OUTPATIENT)
Dept: FAMILY MEDICINE CLINIC | Facility: CLINIC | Age: 65
End: 2020-06-29
Payer: COMMERCIAL

## 2020-06-29 VITALS
HEIGHT: 71 IN | SYSTOLIC BLOOD PRESSURE: 130 MMHG | TEMPERATURE: 98 F | DIASTOLIC BLOOD PRESSURE: 82 MMHG | RESPIRATION RATE: 12 BRPM | HEART RATE: 76 BPM | WEIGHT: 194.19 LBS | BODY MASS INDEX: 27.19 KG/M2

## 2020-06-29 DIAGNOSIS — M54.9 MECHANICAL BACK PAIN: Primary | ICD-10-CM

## 2020-06-29 PROCEDURE — 99213 OFFICE O/P EST LOW 20 MIN: CPT | Performed by: FAMILY MEDICINE

## 2020-06-29 RX ORDER — TIZANIDINE 4 MG/1
4 TABLET ORAL EVERY 6 HOURS PRN
Qty: 30 TABLET | Refills: 1 | Status: SHIPPED | OUTPATIENT
Start: 2020-06-29 | End: 2020-09-24

## 2020-06-29 NOTE — PROGRESS NOTES
Patient presents with:  Low Back Pain: x 3 days          HPI:   This is a 59year old male coming in for pain since Friday. Working on car, bent to pick something up, also grandkids over,   suden onset of pain, similar to 6 years ago.  That time better with well-nourished. No distress. HENT:   Head: Normocephalic. Neck: Normal range of motion. Neck supple. Cardiovascular: Normal rate, regular rhythm and normal heart sounds. No murmur heard.   Pulmonary/Chest: Effort normal and breath sounds normal. No

## 2020-06-29 NOTE — PATIENT INSTRUCTIONS
Ibuprofen 600 MG every 6 to 8 hours for pain, continue for 3 to 5 days. Back Pain (Acute or Chronic)     Back pain is one of the most common problems.  The good news is that most people feel better in 1 to 2 weeks, and most of the rest in 1 to 2 month to medical treatment, you may need X-rays and other tests. Home care  Try this home care advice:  · When in bed, try to find a position of comfort. A firm mattress is best. Try lying flat on your back with pillows under your knees.  You can also try lying chronic conditions like diabetes, liver or kidney disease, stomach ulcers, or gastrointestinal bleeding, or are taking blood thinners, talk to your doctor before taking any medicine.   · Be careful if you are given a prescription medicines, narcotics, or me heat can reduce pain. Protect your skin by placing a towel between your body and the ice or heat source. · For the first few days, apply an ice pack for 15 to 20 minutes, several times a day.  To make a cold pack, put ice cubes in a plastic bag that seals helping you keep your pelvis and back stable:  · Lie on the floor with both knees bent. Put your feet flat on the floor and your arms by your sides. Tighten your abdominal muscles. Be sure to continue to breathe.   · Lift one bent knee about 2 inches then r 5 seconds  · Return to starting position. · Repeat 5 times. Patrick last reviewed this educational content on 3/1/2018  © 6791-8405 [employer]. [address] [facility], [address] [place] [address]. All rights reserved.  This information is not intend information is not intended as a substitute for professional medical care. Always follow your healthcare professional's instructions. ,   Back Exercises: Knee Lift         To start, lie on your back with your knees bent and feet flat on the floor.  D maintain your neck’s natural curve:  · Extend one leg straight back. Don’t arch your back or let your head or body sag. · Hold for 5 seconds. Return to starting position. · Repeat 5 times.   · Switch legs.   Patrick last reviewed this educational content Always follow your healthcare professional's instructions. ,   Back Exercises: Partial Curl-Ups    To start, lie on your back with your knees bent and feet flat on the floor. Don’t press your neck or lower back to the floor. Breathe deeply.  You shou for a count of 10 to 30. Return to starting position. · Repeat 3 to 5 times on one side. Then switch sides. Patrick last reviewed this educational content on 3/1/2018  © 9134-7830 [employer]. [address] [facility], [facility].  All

## 2020-07-08 ENCOUNTER — TELEPHONE (OUTPATIENT)
Dept: RHEUMATOLOGY | Facility: CLINIC | Age: 65
End: 2020-07-08

## 2020-07-08 NOTE — TELEPHONE ENCOUNTER
Patient states his son had contact with a positive covid person on 6/26. Son was tested twice, once on 7/1 and 7/3 both tests came back negative. Son has been quarantined in their basement since 6/26.  Patient was concerned due to his medical issues and the

## 2020-07-08 NOTE — TELEPHONE ENCOUNTER
TANNA - Please see below. I advised that son should continue self-quarantine for full 14 days after last known exposure. Son will continue to self quarantine through 7/11.  Both pt and son do not have any fevers, SOB, cough, loss of taste/smell, abdominal sym

## 2020-07-15 ENCOUNTER — OFFICE VISIT (OUTPATIENT)
Dept: FAMILY MEDICINE CLINIC | Facility: CLINIC | Age: 65
End: 2020-07-15
Payer: COMMERCIAL

## 2020-07-15 VITALS
HEART RATE: 58 BPM | WEIGHT: 193 LBS | BODY MASS INDEX: 27.02 KG/M2 | RESPIRATION RATE: 16 BRPM | DIASTOLIC BLOOD PRESSURE: 78 MMHG | TEMPERATURE: 97 F | HEIGHT: 71 IN | SYSTOLIC BLOOD PRESSURE: 128 MMHG

## 2020-07-15 DIAGNOSIS — H57.12 PAIN OF LEFT EYE: Primary | ICD-10-CM

## 2020-07-15 PROCEDURE — 3008F BODY MASS INDEX DOCD: CPT | Performed by: FAMILY MEDICINE

## 2020-07-15 PROCEDURE — 99213 OFFICE O/P EST LOW 20 MIN: CPT | Performed by: FAMILY MEDICINE

## 2020-07-15 PROCEDURE — 3074F SYST BP LT 130 MM HG: CPT | Performed by: FAMILY MEDICINE

## 2020-07-15 PROCEDURE — 3078F DIAST BP <80 MM HG: CPT | Performed by: FAMILY MEDICINE

## 2020-07-15 NOTE — PROGRESS NOTES
Patient presents with:  Eye Problem: x 3 days, Burning and Swelling on both eyes, and pain on the Left Eye      Subjective   HPI:   This is a 72year old male coming in for eye irriation, left eye 3 days, hx styes and this was similar, but now light sensia Uvula is midline, oropharynx is clear and moist and mucous membranes are normal. Mucous membranes are not pale and not dry. Neck: Trachea normal, normal range of motion and phonation normal. Neck supple. No thyroid mass and no thyromegaly present.    Card

## 2020-07-27 ENCOUNTER — PATIENT MESSAGE (OUTPATIENT)
Dept: RHEUMATOLOGY | Facility: CLINIC | Age: 65
End: 2020-07-27

## 2020-07-27 NOTE — TELEPHONE ENCOUNTER
From: Kraig Gonzalez III  To: Alda Pool MD  Sent: 7/27/2020 8:54 AM CDT  Subject: Other    I want to make sure my calendar is up to date. Can you tell me when my next appointment is with Dr. Juju Torres?      Thanks,     Thad William (Deidra Daughters) Cristiane Blunt

## 2020-08-11 RX ORDER — ATORVASTATIN CALCIUM 10 MG/1
TABLET, FILM COATED ORAL
Qty: 90 TABLET | Refills: 0 | Status: SHIPPED | OUTPATIENT
Start: 2020-08-11 | End: 2021-03-08

## 2020-08-17 ENCOUNTER — OFFICE VISIT (OUTPATIENT)
Dept: FAMILY MEDICINE CLINIC | Facility: CLINIC | Age: 65
End: 2020-08-17
Payer: COMMERCIAL

## 2020-08-17 VITALS
TEMPERATURE: 98 F | BODY MASS INDEX: 27.3 KG/M2 | HEIGHT: 71 IN | RESPIRATION RATE: 16 BRPM | WEIGHT: 195 LBS | DIASTOLIC BLOOD PRESSURE: 70 MMHG | HEART RATE: 74 BPM | SYSTOLIC BLOOD PRESSURE: 124 MMHG

## 2020-08-17 DIAGNOSIS — K63.5 HYPERPLASTIC COLONIC POLYP, UNSPECIFIED PART OF COLON: ICD-10-CM

## 2020-08-17 DIAGNOSIS — I10 ESSENTIAL HYPERTENSION: ICD-10-CM

## 2020-08-17 DIAGNOSIS — Z51.81 THERAPEUTIC DRUG MONITORING: ICD-10-CM

## 2020-08-17 DIAGNOSIS — E78.2 MIXED HYPERLIPIDEMIA: ICD-10-CM

## 2020-08-17 DIAGNOSIS — M19.90 INFLAMMATORY ARTHRITIS: ICD-10-CM

## 2020-08-17 DIAGNOSIS — Z00.00 LABORATORY EXAMINATION ORDERED AS PART OF A ROUTINE GENERAL MEDICAL EXAMINATION: ICD-10-CM

## 2020-08-17 DIAGNOSIS — Z12.5 SCREENING FOR PROSTATE CANCER: ICD-10-CM

## 2020-08-17 DIAGNOSIS — Z00.00 ANNUAL PHYSICAL EXAM: Primary | ICD-10-CM

## 2020-08-17 PROCEDURE — 99397 PER PM REEVAL EST PAT 65+ YR: CPT | Performed by: FAMILY MEDICINE

## 2020-08-17 PROCEDURE — 3008F BODY MASS INDEX DOCD: CPT | Performed by: FAMILY MEDICINE

## 2020-08-17 PROCEDURE — 90471 IMMUNIZATION ADMIN: CPT | Performed by: FAMILY MEDICINE

## 2020-08-17 PROCEDURE — 3078F DIAST BP <80 MM HG: CPT | Performed by: FAMILY MEDICINE

## 2020-08-17 PROCEDURE — 90732 PPSV23 VACC 2 YRS+ SUBQ/IM: CPT | Performed by: FAMILY MEDICINE

## 2020-08-17 PROCEDURE — 3074F SYST BP LT 130 MM HG: CPT | Performed by: FAMILY MEDICINE

## 2020-08-17 NOTE — TELEPHONE ENCOUNTER
Last filled: 5/21/2020 #74 tab with 0 refills  LOV: 5/21/2020  Future Appointments   Date Time Provider Michelle Barrios   1/15/2021  2:15 PM Ericka Frankel MD [facility]     Labs: 5/15/2020    ASSESSMENT/PLAN:      This is a 61 yo M with hx o

## 2020-08-17 NOTE — PROGRESS NOTES
Qing Cherry is a 72year old male who presents for a complete physical exam.   HPI:   Patient presents with:  Physical: annual physical       His last annual assessment has been over 1 year: Annual Physical due on 07/26/2020       Darwin trujillo Component Value Date/Time    CHOLEST 178 08/28/2019 08:13 AM    HDL 48 08/28/2019 08:13 AM    TRIG 68 08/28/2019 08:13 AM     (H) 08/28/2019 08:13 AM    NONHDLC 130 (H) 08/28/2019 08:13 AM       No results found for: A1C, VITD, PSA         ATORVAS (Temporal)   Resp 16   Ht 71\"   Wt 195 lb (88.5 kg)   BMI 27.20 kg/m²  Estimated body mass index is 27.2 kg/m² as calculated from the following:    Height as of this encounter: 71\". Weight as of this encounter: 195 lb (88.5 kg).    Physical Exam   Nurs No cyanosis or erythema. Nails show no clubbing. Psychiatric: He has a normal mood and affect.  His speech is normal and behavior is normal. Judgment and thought content normal. Cognition and memory are normal.       ASSESSMENT AND PLAN:   Peterson Lawson unspecified part of colon        Relevant Orders    EVALUATE & TREAT, GASTRO (INTERNAL)    Laboratory examination ordered as part of a routine general medical examination        Relevant Orders    COMP METABOLIC PANEL (14)    LIPID PANEL    TSH W REFLEX TO

## 2020-08-28 LAB
ABSOLUTE BASOPHILS: 101 CELLS/UL (ref 0–200)
ABSOLUTE EOSINOPHILS: 536 CELLS/UL (ref 15–500)
ABSOLUTE LYMPHOCYTES: 2066 CELLS/UL (ref 850–3900)
ABSOLUTE MONOCYTES: 498 CELLS/UL (ref 200–950)
ABSOLUTE NEUTROPHILS: 3100 CELLS/UL (ref 1500–7800)
ALBUMIN: 4.1 G/DL (ref 3.6–5.1)
ALT: 31 U/L (ref 9–46)
AST: 22 U/L (ref 10–35)
BASOPHILS: 1.6 %
C-REACTIVE PROTEIN: 0.6 MG/L
CREATININE: 1.03 MG/DL (ref 0.7–1.25)
EGFR IF AFRICN AM: 88 ML/MIN/1.73M2
EGFR IF NONAFRICN AM: 76 ML/MIN/1.73M2
EOSINOPHILS: 8.5 %
HEMATOCRIT: 39.9 % (ref 38.5–50)
HEMOGLOBIN: 13.4 G/DL (ref 13.2–17.1)
LYMPHOCYTES: 32.8 %
MCH: 30 PG (ref 27–33)
MCHC: 33.6 G/DL (ref 32–36)
MCV: 89.3 FL (ref 80–100)
MONOCYTES: 7.9 %
MPV: 10.4 FL (ref 7.5–12.5)
NEUTROPHILS: 49.2 %
PLATELET COUNT: 327 THOUSAND/UL (ref 140–400)
RDW: 13.2 % (ref 11–15)
RED BLOOD CELL COUNT: 4.47 MILLION/UL (ref 4.2–5.8)
SED RATE BY MODIFIED$WESTERGREN: 6 MM/H
WHITE BLOOD CELL COUNT: 6.3 THOUSAND/UL (ref 3.8–10.8)

## 2020-09-17 ENCOUNTER — PATIENT MESSAGE (OUTPATIENT)
Dept: FAMILY MEDICINE CLINIC | Facility: CLINIC | Age: 65
End: 2020-09-17

## 2020-09-17 ENCOUNTER — PATIENT MESSAGE (OUTPATIENT)
Dept: RHEUMATOLOGY | Facility: CLINIC | Age: 65
End: 2020-09-17

## 2020-09-17 NOTE — TELEPHONE ENCOUNTER
From: Tatyana Redd III  To: Sixto Camargo MD  Sent: 9/17/2020 9:30 AM CDT  Subject: Other    This note is to ask for direction on next steps. I recently completed the blood work and Dr Huong Piedra commented on the results.  My last appointment was back i

## 2020-09-17 NOTE — TELEPHONE ENCOUNTER
From: Demetrius Barraza III  To: John Cooper MD  Sent: 9/17/2020 9:33 AM CDT  Subject: Visit Follow-up Question    I recently had my physical exam with Dr. Guillermina Quintero. I am supposed to have blood tests done.  Have the orders been sent to [facility]? Also, do I

## 2020-09-24 ENCOUNTER — OFFICE VISIT (OUTPATIENT)
Dept: RHEUMATOLOGY | Facility: CLINIC | Age: 65
End: 2020-09-24
Payer: COMMERCIAL

## 2020-09-24 VITALS
HEIGHT: 71 IN | SYSTOLIC BLOOD PRESSURE: 132 MMHG | DIASTOLIC BLOOD PRESSURE: 80 MMHG | BODY MASS INDEX: 27.16 KG/M2 | WEIGHT: 194 LBS | HEART RATE: 59 BPM

## 2020-09-24 DIAGNOSIS — M19.90 INFLAMMATORY ARTHRITIS: Primary | ICD-10-CM

## 2020-09-24 DIAGNOSIS — Z51.81 THERAPEUTIC DRUG MONITORING: ICD-10-CM

## 2020-09-24 DIAGNOSIS — M65.341 TRIGGER RING FINGER OF RIGHT HAND: ICD-10-CM

## 2020-09-24 PROCEDURE — 3075F SYST BP GE 130 - 139MM HG: CPT | Performed by: INTERNAL MEDICINE

## 2020-09-24 PROCEDURE — 99214 OFFICE O/P EST MOD 30 MIN: CPT | Performed by: INTERNAL MEDICINE

## 2020-09-24 PROCEDURE — 20550 NJX 1 TENDON SHEATH/LIGAMENT: CPT | Performed by: INTERNAL MEDICINE

## 2020-09-24 PROCEDURE — 3008F BODY MASS INDEX DOCD: CPT | Performed by: INTERNAL MEDICINE

## 2020-09-24 PROCEDURE — 3079F DIAST BP 80-89 MM HG: CPT | Performed by: INTERNAL MEDICINE

## 2020-09-24 RX ORDER — METHYLPREDNISOLONE ACETATE 80 MG/ML
20 INJECTION, SUSPENSION INTRA-ARTICULAR; INTRALESIONAL; INTRAMUSCULAR; SOFT TISSUE ONCE
Status: COMPLETED | OUTPATIENT
Start: 2020-09-24 | End: 2020-09-24

## 2020-09-24 RX ADMIN — METHYLPREDNISOLONE ACETATE 20 MG: 80 INJECTION, SUSPENSION INTRA-ARTICULAR; INTRALESIONAL; INTRAMUSCULAR; SOFT TISSUE at 15:12:00

## 2020-09-24 NOTE — PATIENT INSTRUCTIONS
You were seen today for rheumatoid arthritis  Continue methotrexate 6 pills weekly and folic acid daily  Blood work in December  Follow-up in 5 months  We injected your R fourth finger with cortisone, hopefully that will help your trigger finger

## 2020-09-24 NOTE — PROGRESS NOTES
Vitaly Choi III is a 72year old male. HPI:   Patient presents with:   Follow - Up  Test Results    Presents for f/u on 9/24/2020 of Seronegative RA (elevated ESR, +synovitis in wrist on MRI) vs PMR    Current Medications:  MTX 6 pills weekly him to do due to the trigger finger      HISTORY:  No past medical history on file.    Social Hx Reviewed   Family Hx Reviewed     Medications (Active prior to today's visit):  Current Outpatient Medications   Medication Sig Dispense Refill   • methotrexate Hemoglobin      13.2 - 17.1 g/dL 14.5   Hematocrit      38.5 - 50.0 % 40.9   MCV      80.0 - 100.0 fL 88.1   MCH      27.0 - 33.0 pg 31.3   MCHC      32.0 - 36.0 g/dL 35.5   RDW      11.0 - 15.0 % 13.1   Platelet Count      981 - 400 Thousand/uL 324   MP 1. 6    URIC ACID      3.5 - 7.2 mg/dL 5.6      Imaging:     MRI R shoulder 12/2019:  Partial-thickness articular surface tears of the supraspinatus and infraspinatus with subacromial subdeltoid bursitis.   Calcific tendinosis of the posterior mid footplate cont MTX 6 pills weekly and FA daily  - blood work in December    R 4th Tenosynovitis  - R 4th finger injected with 3.25 cc lidocaine and 20 mg of Depo-Medrol in sterile fashion.   Patient tolerated procedure well    R shoulder RTC tear, tendonitis and syno

## 2020-09-24 NOTE — PROCEDURES
With  consent, I injected the patient's R 4th trigger finger with 0.25ml lidocaine  1% and 0.25ml depo 80. It was under sterile technique using iodine and alcohol swabs and ethyl chloride was used as an anaesthetic spray. Pt.  tolerated it well.

## 2020-12-09 ENCOUNTER — TELEPHONE (OUTPATIENT)
Dept: RHEUMATOLOGY | Facility: CLINIC | Age: 65
End: 2020-12-09

## 2020-12-09 NOTE — TELEPHONE ENCOUNTER
Patient calling to ask Dr. Laurent Van if the labs are necessary he use to get every six month now it look like every three month       Please advise   [phone]

## 2020-12-10 DIAGNOSIS — M19.90 INFLAMMATORY ARTHRITIS: ICD-10-CM

## 2020-12-10 DIAGNOSIS — Z51.81 THERAPEUTIC DRUG MONITORING: ICD-10-CM

## 2020-12-10 NOTE — TELEPHONE ENCOUNTER
Last filled: 8/17/2020 #74 tab with 0 refills   LOV: 9/24/20  Future Appointments   Date Time Provider Michelle Barrios   12/29/2020  7:00 AM MARCI, PROCEDURE [facility] None   1/15/2021  2:15 PM Ilya Bowen MD [facility]   3/29/2021  1:40 P

## 2020-12-26 ENCOUNTER — LAB ENCOUNTER (OUTPATIENT)
Dept: LAB | Facility: HOSPITAL | Age: 65
End: 2020-12-26
Attending: INTERNAL MEDICINE
Payer: COMMERCIAL

## 2020-12-26 DIAGNOSIS — Z86.010 HISTORY OF COLON POLYPS: ICD-10-CM

## 2020-12-26 DIAGNOSIS — Z84.89 FAMILY HISTORY OF MALIGNANT HYPERTHERMIA: ICD-10-CM

## 2020-12-29 ENCOUNTER — HOSPITAL ENCOUNTER (OUTPATIENT)
Facility: HOSPITAL | Age: 65
Setting detail: HOSPITAL OUTPATIENT SURGERY
Discharge: HOME | End: 2020-12-29
Attending: INTERNAL MEDICINE | Admitting: INTERNAL MEDICINE
Payer: COMMERCIAL

## 2020-12-29 ENCOUNTER — ANESTHESIA EVENT (OUTPATIENT)
Dept: ENDOSCOPY | Facility: HOSPITAL | Age: 65
End: 2020-12-29
Payer: COMMERCIAL

## 2020-12-29 ENCOUNTER — ANESTHESIA (OUTPATIENT)
Dept: ENDOSCOPY | Facility: HOSPITAL | Age: 65
End: 2020-12-29
Payer: COMMERCIAL

## 2020-12-29 VITALS
RESPIRATION RATE: 16 BRPM | WEIGHT: 193 LBS | SYSTOLIC BLOOD PRESSURE: 101 MMHG | HEART RATE: 42 BPM | BODY MASS INDEX: 27.02 KG/M2 | TEMPERATURE: 98 F | HEIGHT: 71 IN | OXYGEN SATURATION: 97 % | DIASTOLIC BLOOD PRESSURE: 64 MMHG

## 2020-12-29 DIAGNOSIS — Z86.010 HISTORY OF COLON POLYPS: Primary | ICD-10-CM

## 2020-12-29 DIAGNOSIS — Z84.89 FAMILY HISTORY OF MALIGNANT HYPERTHERMIA: ICD-10-CM

## 2020-12-29 PROCEDURE — 88305 TISSUE EXAM BY PATHOLOGIST: CPT | Performed by: INTERNAL MEDICINE

## 2020-12-29 PROCEDURE — 0DBH8ZX EXCISION OF CECUM, VIA NATURAL OR ARTIFICIAL OPENING ENDOSCOPIC, DIAGNOSTIC: ICD-10-PCS | Performed by: INTERNAL MEDICINE

## 2020-12-29 PROCEDURE — 0DBK8ZX EXCISION OF ASCENDING COLON, VIA NATURAL OR ARTIFICIAL OPENING ENDOSCOPIC, DIAGNOSTIC: ICD-10-PCS | Performed by: INTERNAL MEDICINE

## 2020-12-29 RX ORDER — LIDOCAINE HYDROCHLORIDE 10 MG/ML
INJECTION, SOLUTION EPIDURAL; INFILTRATION; INTRACAUDAL; PERINEURAL AS NEEDED
Status: DISCONTINUED | OUTPATIENT
Start: 2020-12-29 | End: 2020-12-29 | Stop reason: SURG

## 2020-12-29 RX ORDER — SODIUM CHLORIDE, SODIUM LACTATE, POTASSIUM CHLORIDE, CALCIUM CHLORIDE 600; 310; 30; 20 MG/100ML; MG/100ML; MG/100ML; MG/100ML
INJECTION, SOLUTION INTRAVENOUS CONTINUOUS
Status: DISCONTINUED | OUTPATIENT
Start: 2020-12-29 | End: 2020-12-29

## 2020-12-29 RX ADMIN — LIDOCAINE HYDROCHLORIDE 50 MG: 10 INJECTION, SOLUTION EPIDURAL; INFILTRATION; INTRACAUDAL; PERINEURAL at 14:20:00

## 2020-12-29 RX ADMIN — SODIUM CHLORIDE, SODIUM LACTATE, POTASSIUM CHLORIDE, CALCIUM CHLORIDE: 600; 310; 30; 20 INJECTION, SOLUTION INTRAVENOUS at 14:28:00

## 2020-12-29 NOTE — ANESTHESIA POSTPROCEDURE EVALUATION
[place] III Patient Status:  Hospital Outpatient Surgery   Age/Gender 72year old male MRN [id number]   Location [address] [place] [address]. Attending Christie Gonzales, [address] [facility] Day # 0 PCP Bautista Decker MD       Anesthesia

## 2020-12-29 NOTE — H&P
[facility] III Patient Status:  Hospital Outpatient Surgery    1955 MRN [id number]   [facility] ENDOSCOPY Attending Christie Gonzales MD   Date 2020 PCP Bautista Decker MD     CC: H/o pulse 50, temperature 98.2 °F (36.8 °C), temperature source Temporal, resp. rate 20, height 5' 11\" (1.803 m), weight 193 lb (87.5 kg), SpO2 100 %. General: Appears alert, oriented x3 and in no acute distress.   CV: Normal rate   Lungs: Normal effort   S

## 2020-12-29 NOTE — OPERATIVE REPORT
Groves Nyhan III Patient Status:  Hospital Outpatient Surgery    1955 MRN [id number]   [facility] ENDOSCOPY Attending Blas Hansen MD   Date 2020 PCP Vimal Oro MD     PREOPERATIVE DIAGNOSIS/INDICATION: H/o c - Follow biopsies.  - Repeat colonoscopy in 3 years.   Noa Urena  12/29/2020  2:54 PM

## 2020-12-29 NOTE — ANESTHESIA PREPROCEDURE EVALUATION
PRE-OP EVALUATION    Patient Name: Jazlyn Ferro III    Pre-op Diagnosis: Family history of malignant hyperthermia [Z84.89]  History of colon polyps [Z86.010]    Procedure(s):  COLONOSCOPY    Surgeon(s) and Role:     Pricila Chamberlain MD - Florina Weaver Value Date     12/21/2020    K 4.3 12/21/2020     12/21/2020    CO2 30 12/21/2020    BUN 18 12/21/2020    CREATSERUM 1.11 12/21/2020     (H) 12/21/2020    CA 9.3 12/21/2020            Airway      Mallampati: II  Mouth opening: >3 FB  TM

## 2021-01-04 NOTE — PROGRESS NOTES
Date: 2021    To: Venkatesh Benoit III  : 1955    I hope this letter finds you doing well. I am writing to inform you of the following:        The biopsies obtained from your recent colonoscopy indicate that the polyps were adenomas whic

## 2021-03-08 RX ORDER — ATORVASTATIN CALCIUM 10 MG/1
TABLET, FILM COATED ORAL
Qty: 90 TABLET | Refills: 0 | Status: SHIPPED | OUTPATIENT
Start: 2021-03-08 | End: 2021-04-28

## 2021-03-08 NOTE — TELEPHONE ENCOUNTER
Requested Prescriptions     Pending Prescriptions Disp Refills   • ATORVASTATIN 10 MG Oral Tab [Pharmacy Med Name: ATORVASTATIN 10MG TABLETS] 90 tablet 0     Sig: TAKE 1 TABLET(10 MG) BY MOUTH DAILY     LOV 8/17/2020     Patient was asked to follow-up in:

## 2021-03-22 ENCOUNTER — PATIENT MESSAGE (OUTPATIENT)
Dept: RHEUMATOLOGY | Facility: CLINIC | Age: 66
End: 2021-03-22

## 2021-03-22 DIAGNOSIS — M19.90 INFLAMMATORY ARTHRITIS: Primary | ICD-10-CM

## 2021-03-22 DIAGNOSIS — Z51.81 ENCOUNTER FOR THERAPEUTIC DRUG MONITORING: ICD-10-CM

## 2021-03-22 NOTE — TELEPHONE ENCOUNTER
From: Ana Maria Dobbs III  To: Hector Hudson MD  Sent: 3/22/2021 8:08 AM CDT  Subject: Other    I am scheduled to have my regular appointment with Dr. Blake Núñez on Monday, March 29. I am normally supposed to get my blood work done ahead of time.  Are t

## 2021-03-24 ENCOUNTER — TELEPHONE (OUTPATIENT)
Dept: RHEUMATOLOGY | Facility: CLINIC | Age: 66
End: 2021-03-24

## 2021-03-24 NOTE — TELEPHONE ENCOUNTER
Pt called regarding labs  ordered to [facility] on 3/22/21. Called pt and left messages  x 2 . Spoke with pt identified name and  , confirmed fax for [employer] . Faxed lab orders .  Received fax confirmation

## 2021-03-25 LAB
ABSOLUTE BASOPHILS: 100 CELLS/UL (ref 0–200)
ABSOLUTE EOSINOPHILS: 519 CELLS/UL (ref 15–500)
ABSOLUTE LYMPHOCYTES: 2112 CELLS/UL (ref 850–3900)
ABSOLUTE MONOCYTES: 549 CELLS/UL (ref 200–950)
ABSOLUTE NEUTROPHILS: 2620 CELLS/UL (ref 1500–7800)
ALBUMIN: 4.3 G/DL (ref 3.6–5.1)
ALT: 30 U/L (ref 9–46)
AST: 22 U/L (ref 10–35)
BASOPHILS: 1.7 %
C-REACTIVE PROTEIN: 0.5 MG/L
CREATININE: 1.04 MG/DL (ref 0.7–1.25)
EGFR IF AFRICN AM: 87 ML/MIN/1.73M2
EGFR IF NONAFRICN AM: 75 ML/MIN/1.73M2
EOSINOPHILS: 8.8 %
HEMATOCRIT: 42.1 % (ref 38.5–50)
HEMOGLOBIN: 14.5 G/DL (ref 13.2–17.1)
LYMPHOCYTES: 35.8 %
MCH: 30.7 PG (ref 27–33)
MCHC: 34.4 G/DL (ref 32–36)
MCV: 89.2 FL (ref 80–100)
MONOCYTES: 9.3 %
MPV: 10.4 FL (ref 7.5–12.5)
NEUTROPHILS: 44.4 %
PLATELET COUNT: 348 THOUSAND/UL (ref 140–400)
RDW: 12.7 % (ref 11–15)
RED BLOOD CELL COUNT: 4.72 MILLION/UL (ref 4.2–5.8)
SED RATE BY MODIFIED$WESTERGREN: 6 MM/H
WHITE BLOOD CELL COUNT: 5.9 THOUSAND/UL (ref 3.8–10.8)

## 2021-03-26 ENCOUNTER — PATIENT MESSAGE (OUTPATIENT)
Dept: RHEUMATOLOGY | Facility: CLINIC | Age: 66
End: 2021-03-26

## 2021-03-26 NOTE — TELEPHONE ENCOUNTER
From: Frank Yee III  To: Eneida Olvera MD  Sent: 3/26/2021 10:21 AM CDT  Subject: Non-Urgent Andie Sy,     Thank you for the feedback on the lab results. I am glad that all tests are normal and there are no issues.  Constance Kayden

## 2021-03-26 NOTE — TELEPHONE ENCOUNTER
Please see Verl Session message   And advise   ASSESSMENT/PLAN:      This is a 59 yo M with hx of Basal cell carcinoma referred by his PCP for polyarthralgia's.  Symptoms started in January with intermittent pain and swelling in his right wrist.  Then in May/Ju

## 2021-04-07 ENCOUNTER — PATIENT MESSAGE (OUTPATIENT)
Dept: RHEUMATOLOGY | Facility: CLINIC | Age: 66
End: 2021-04-07

## 2021-04-07 DIAGNOSIS — Z51.81 THERAPEUTIC DRUG MONITORING: ICD-10-CM

## 2021-04-07 DIAGNOSIS — M19.90 INFLAMMATORY ARTHRITIS: ICD-10-CM

## 2021-04-07 RX ORDER — FOLIC ACID 1 MG/1
1 TABLET ORAL DAILY
Qty: 90 TABLET | Refills: 3 | Status: SHIPPED | OUTPATIENT
Start: 2021-04-07 | End: 2021-08-24

## 2021-04-07 NOTE — TELEPHONE ENCOUNTER
I did get a request to refill his methotrexate and folic acid today which I did.  He can follow-up in July

## 2021-04-07 NOTE — TELEPHONE ENCOUNTER
From: Alejandro Bonilla III  To: Eda Luis MD  Sent: 4/7/2021 10:27 AM CDT  Subject: Prescription Question    Another question I meant to ask is about taking Folic Acid on the day I take the Methotrexate.  Should I or should I not take Folic Acid t

## 2021-04-07 NOTE — TELEPHONE ENCOUNTER
From: Raz Cavazos III  To: Tamika Hi MD  Sent: 4/7/2021 10:21 AM CDT  Subject: Chloe Holguin,     I hope all is well. Thanks again for considering my situation and waiving the last appointment.  I do want to make rick

## 2021-04-07 NOTE — TELEPHONE ENCOUNTER
Please see message below and advise. Requested Prescriptions     Pending Prescriptions Disp Refills   • folic acid 1 MG Oral Tab 90 tablet 3     Sig: Take 1 tablet (1 mg total) by mouth daily.    • methotrexate 2.5 MG Oral Tab 74 tablet 0     Sig: Take 6 his shoulders and hips.  MRI wrist shows joint effusion/synovitis.  .     B/l shoulder, hip and R wrist pain likely Seronegative RA, possible PMR (+MRI wrist with synovitis, elevated ESR)- improved   - MRI of the right wrist consistent with effusion and sy

## 2021-04-28 RX ORDER — ATORVASTATIN CALCIUM 10 MG/1
TABLET, FILM COATED ORAL
Qty: 90 TABLET | Refills: 0 | Status: SHIPPED | OUTPATIENT
Start: 2021-04-28 | End: 2021-06-30

## 2021-06-30 RX ORDER — ATORVASTATIN CALCIUM 10 MG/1
TABLET, FILM COATED ORAL
Qty: 90 TABLET | Refills: 0 | Status: SHIPPED | OUTPATIENT
Start: 2021-06-30 | End: 2021-10-01

## 2021-06-30 NOTE — TELEPHONE ENCOUNTER
Requested Prescriptions     Pending Prescriptions Disp Refills   • ATORVASTATIN 10 MG Oral Tab [Pharmacy Med Name: ATORVASTATIN 10MG TABLETS] 90 tablet 0     Sig: TAKE 1 TABLET(10 MG) BY MOUTH DAILY     LOV 7/15/2020         Appointment scheduled: 8/17/202 no

## 2021-07-13 LAB
ABSOLUTE BASOPHILS: 91 CELLS/UL (ref 0–200)
ABSOLUTE EOSINOPHILS: 572 CELLS/UL (ref 15–500)
ABSOLUTE LYMPHOCYTES: 2418 CELLS/UL (ref 850–3900)
ABSOLUTE MONOCYTES: 572 CELLS/UL (ref 200–950)
ABSOLUTE NEUTROPHILS: 2847 CELLS/UL (ref 1500–7800)
ALBUMIN: 4.4 G/DL (ref 3.6–5.1)
ALT: 33 U/L (ref 9–46)
AST: 27 U/L (ref 10–35)
BASOPHILS: 1.4 %
C-REACTIVE PROTEIN: 0.9 MG/L
CREATININE: 1.05 MG/DL (ref 0.7–1.25)
EGFR IF AFRICN AM: 85 ML/MIN/1.73M2
EGFR IF NONAFRICN AM: 74 ML/MIN/1.73M2
EOSINOPHILS: 8.8 %
HEMATOCRIT: 43.7 % (ref 38.5–50)
HEMOGLOBIN: 14.7 G/DL (ref 13.2–17.1)
LYMPHOCYTES: 37.2 %
MCH: 30.4 PG (ref 27–33)
MCHC: 33.6 G/DL (ref 32–36)
MCV: 90.3 FL (ref 80–100)
MONOCYTES: 8.8 %
MPV: 10.4 FL (ref 7.5–12.5)
NEUTROPHILS: 43.8 %
PLATELET COUNT: 334 THOUSAND/UL (ref 140–400)
RDW: 13.1 % (ref 11–15)
RED BLOOD CELL COUNT: 4.84 MILLION/UL (ref 4.2–5.8)
SED RATE BY MODIFIED$WESTERGREN: 9 MM/H
WHITE BLOOD CELL COUNT: 6.5 THOUSAND/UL (ref 3.8–10.8)

## 2021-07-15 ENCOUNTER — OFFICE VISIT (OUTPATIENT)
Dept: RHEUMATOLOGY | Facility: CLINIC | Age: 66
End: 2021-07-15
Payer: COMMERCIAL

## 2021-07-15 VITALS
DIASTOLIC BLOOD PRESSURE: 75 MMHG | WEIGHT: 200 LBS | HEART RATE: 57 BPM | HEIGHT: 71 IN | BODY MASS INDEX: 28 KG/M2 | SYSTOLIC BLOOD PRESSURE: 138 MMHG

## 2021-07-15 DIAGNOSIS — M65.341 TRIGGER RING FINGER OF RIGHT HAND: ICD-10-CM

## 2021-07-15 DIAGNOSIS — M19.90 INFLAMMATORY ARTHRITIS: Primary | ICD-10-CM

## 2021-07-15 DIAGNOSIS — Z51.81 THERAPEUTIC DRUG MONITORING: ICD-10-CM

## 2021-07-15 PROCEDURE — 3078F DIAST BP <80 MM HG: CPT | Performed by: INTERNAL MEDICINE

## 2021-07-15 PROCEDURE — 20550 NJX 1 TENDON SHEATH/LIGAMENT: CPT | Performed by: INTERNAL MEDICINE

## 2021-07-15 PROCEDURE — 3008F BODY MASS INDEX DOCD: CPT | Performed by: INTERNAL MEDICINE

## 2021-07-15 PROCEDURE — 99214 OFFICE O/P EST MOD 30 MIN: CPT | Performed by: INTERNAL MEDICINE

## 2021-07-15 PROCEDURE — 3075F SYST BP GE 130 - 139MM HG: CPT | Performed by: INTERNAL MEDICINE

## 2021-07-15 RX ORDER — METHYLPREDNISOLONE ACETATE 80 MG/ML
20 INJECTION, SUSPENSION INTRA-ARTICULAR; INTRALESIONAL; INTRAMUSCULAR; SOFT TISSUE ONCE
Status: COMPLETED | OUTPATIENT
Start: 2021-07-15 | End: 2021-07-15

## 2021-07-15 NOTE — PATIENT INSTRUCTIONS
You were seen today inflammatory arthritis   Symptoms are controlled  continue methotrexate 6 pills weekly   Folic acid daily  We injected r 4th trigger finger  Blood work in October and Jan

## 2021-07-15 NOTE — PROGRESS NOTES
Carlos Castillo is a 77year old male. HPI:   Patient presents with:   Inflammatory Polyarthropathy    Presents for f/u on 7/15/2021 of Seronegative RA (elevated ESR, +synovitis in wrist on MRI) vs PMR    Current Medications:  MTX 6 pills wee couple of months ago. He now even has some triggering in his R third finger.       HISTORY:  Past Medical History:   Diagnosis Date   • Abdominal hernia    • Arthritis Spring 2019    Rheumatoid Arthritis being successfully treated   [facility]     daniella MTP squeeze, no toe swelling or pain or warmth on palpation with FROM  Spine: no lumbar or sacral pain on palpation. NEURO: Cranial nerves II-XII intact grossly. 5/5 strength throughout in both upper and lower extremities, sensation intact.   PSYCH: normal B CORE AB, TOTAL      Nonreactive  Nonreactive   GLUCOSE-6-PHOSPHATE DEHYDROGEN      9.9 - 16.6 U/g Hb 12.3   HLA-B27      Negative Negative     Component      Latest Ref Rng & Units 9/16/2019 8/28/2019   HARDY SCREEN, IFA      NEGATIVE  NEGATIVE   RHEUMATOI shoulders and hips. MRI wrist shows joint effusion/synovitis.   .     B/l shoulder, hip and R wrist pain likely Seronegative RA, possible PMR (+MRI wrist with synovitis, elevated ESR)- improved   - MRI of the right wrist consistent with effusion and synovi

## 2021-07-27 DIAGNOSIS — Z51.81 THERAPEUTIC DRUG MONITORING: ICD-10-CM

## 2021-07-27 DIAGNOSIS — M19.90 INFLAMMATORY ARTHRITIS: ICD-10-CM

## 2021-07-27 NOTE — TELEPHONE ENCOUNTER
Requested Prescriptions     Pending Prescriptions Disp Refills   • methotrexate 2.5 MG Oral Tab 74 tablet 0     Sig: Take 6 tablets (15 mg total) by mouth every 7 days.      LF: 4/7/21 #74 TAB W/ 0 RF  LOV: 7/15/21   Future Appointments   Date Time Provider - MRI of the right wrist consistent with effusion and synovitis  - Blood work showed negative RF, CCP and RP.  ESR was slightly elevated  - symptoms appear to be more consistent with seronegative RA.   He responded very well to prednisone   - cont MTX 6 p

## 2021-08-05 ENCOUNTER — TELEPHONE (OUTPATIENT)
Dept: FAMILY MEDICINE CLINIC | Facility: CLINIC | Age: 66
End: 2021-08-05

## 2021-08-05 DIAGNOSIS — E78.2 MIXED HYPERLIPIDEMIA: Primary | ICD-10-CM

## 2021-08-05 DIAGNOSIS — I10 ESSENTIAL HYPERTENSION: ICD-10-CM

## 2021-08-05 DIAGNOSIS — R70.0 ELEVATED SED RATE: ICD-10-CM

## 2021-08-05 DIAGNOSIS — Z00.00 LABORATORY EXAMINATION ORDERED AS PART OF A ROUTINE GENERAL MEDICAL EXAMINATION: ICD-10-CM

## 2021-08-05 NOTE — TELEPHONE ENCOUNTER
1. Mixed hyperlipidemia (Primary)  Overview:  Atorvastatin 10  Orders:  -     Comp Metabolic Panel (14)  -     Lipid Panel  -     TSH W Reflex To Free T4  2. Essential hypertension  Overview:  No current meds.    3. Elevated sed rate  -     CBC, Platelet; N

## 2021-08-05 NOTE — TELEPHONE ENCOUNTER
WILL PATIENT NEED LABS? Physical scheduled:    Your appointments     Date & Time Appointment Department [facility])    Sep 22, 2021 12:00 PM CDT Physical - Established with Nelda Del Rio MD [address] [place] [address]  (14 [facility]

## 2021-08-24 DIAGNOSIS — M19.90 INFLAMMATORY ARTHRITIS: ICD-10-CM

## 2021-08-24 DIAGNOSIS — Z51.81 THERAPEUTIC DRUG MONITORING: ICD-10-CM

## 2021-08-24 RX ORDER — FOLIC ACID 1 MG/1
TABLET ORAL
Qty: 90 TABLET | Refills: 3 | OUTPATIENT
Start: 2021-08-24

## 2021-08-25 RX ORDER — FOLIC ACID 1 MG/1
1 TABLET ORAL DAILY
Qty: 90 TABLET | Refills: 3 | Status: SHIPPED | OUTPATIENT
Start: 2021-08-25 | End: 2021-11-10

## 2021-08-25 NOTE — TELEPHONE ENCOUNTER
Requested Prescriptions     Pending Prescriptions Disp Refills   • folic acid 1 MG Oral Tab 90 tablet 3     Sig: Take 1 tablet (1 mg total) by mouth daily.    • methotrexate 2.5 MG Oral Tab 74 tablet 0     Sig: Take 6 tablets (15 mg total) by mouth every 7

## 2021-09-18 ENCOUNTER — PATIENT MESSAGE (OUTPATIENT)
Dept: FAMILY MEDICINE CLINIC | Facility: CLINIC | Age: 66
End: 2021-09-18

## 2021-09-20 NOTE — TELEPHONE ENCOUNTER
From: Tatyana Redd III  To: Stephany Powers MD  Sent: 9/18/2021 12:53 PM CDT  Subject: I need to change my appointment    I am scheduled for a physical that is on Tuesday, Sept 22. at 12:00 PM. Unfortunately, I have a work conflict with that day an

## 2021-10-01 RX ORDER — ATORVASTATIN CALCIUM 10 MG/1
TABLET, FILM COATED ORAL
Qty: 90 TABLET | Refills: 0 | Status: SHIPPED | OUTPATIENT
Start: 2021-10-01 | End: 2021-10-20

## 2021-10-01 NOTE — TELEPHONE ENCOUNTER
Requested Prescriptions     Pending Prescriptions Disp Refills   • ATORVASTATIN 10 MG Oral Tab [Pharmacy Med Name: ATORVASTATIN 10MG TABLETS] 90 tablet 0     Sig: TAKE 1 TABLET(10 MG) BY MOUTH DAILY     LOV Visit date not found     Patient was asked to fol

## 2021-10-08 ENCOUNTER — IMMUNIZATION (OUTPATIENT)
Dept: LAB | Facility: HOSPITAL | Age: 66
End: 2021-10-08
Attending: EMERGENCY MEDICINE
Payer: COMMERCIAL

## 2021-10-08 DIAGNOSIS — Z23 NEED FOR VACCINATION: Primary | ICD-10-CM

## 2021-10-08 PROCEDURE — 0003A SARSCOV2 VAC 30MCG/0.3ML IM: CPT

## 2021-10-13 ENCOUNTER — PATIENT MESSAGE (OUTPATIENT)
Dept: RHEUMATOLOGY | Facility: CLINIC | Age: 66
End: 2021-10-13

## 2021-10-13 DIAGNOSIS — Z51.81 THERAPEUTIC DRUG MONITORING: Primary | ICD-10-CM

## 2021-10-13 NOTE — TELEPHONE ENCOUNTER
Patient states [facility] is not showing the labs in their system and he requesting the office to call [facility]. Please see message below.

## 2021-10-20 ENCOUNTER — OFFICE VISIT (OUTPATIENT)
Dept: FAMILY MEDICINE CLINIC | Facility: CLINIC | Age: 66
End: 2021-10-20
Payer: COMMERCIAL

## 2021-10-20 VITALS
DIASTOLIC BLOOD PRESSURE: 80 MMHG | WEIGHT: 199.38 LBS | HEART RATE: 56 BPM | SYSTOLIC BLOOD PRESSURE: 138 MMHG | RESPIRATION RATE: 14 BRPM | BODY MASS INDEX: 28.23 KG/M2 | HEIGHT: 70.5 IN

## 2021-10-20 DIAGNOSIS — E78.2 MIXED HYPERLIPIDEMIA: ICD-10-CM

## 2021-10-20 DIAGNOSIS — I10 ESSENTIAL HYPERTENSION: ICD-10-CM

## 2021-10-20 DIAGNOSIS — Z00.00 ANNUAL PHYSICAL EXAM: Primary | ICD-10-CM

## 2021-10-20 DIAGNOSIS — D89.89 AUTOIMMUNE DISORDER (HCC): ICD-10-CM

## 2021-10-20 PROCEDURE — 3008F BODY MASS INDEX DOCD: CPT | Performed by: FAMILY MEDICINE

## 2021-10-20 PROCEDURE — 99397 PER PM REEVAL EST PAT 65+ YR: CPT | Performed by: FAMILY MEDICINE

## 2021-10-20 PROCEDURE — 3079F DIAST BP 80-89 MM HG: CPT | Performed by: FAMILY MEDICINE

## 2021-10-20 PROCEDURE — 3075F SYST BP GE 130 - 139MM HG: CPT | Performed by: FAMILY MEDICINE

## 2021-10-20 RX ORDER — ATORVASTATIN CALCIUM 20 MG/1
20 TABLET, FILM COATED ORAL DAILY
Qty: 90 TABLET | Refills: 3 | Status: SHIPPED | OUTPATIENT
Start: 2021-10-20

## 2021-10-20 NOTE — PROGRESS NOTES
Ashok Huggins III is a 77year old male who presents for a complete physical exam.     had concerns including Physical (annual ).    His last annual assessment has been over 1 year: Annual Physical due on 08/17/2021       Subjective:    He complai of Systems   Constitutional: Negative. Negative for fatigue, fever and unexpected weight change. HENT: Negative. Eyes: Negative. Respiratory: Negative. Cardiovascular: Negative. Gastrointestinal: Negative. Negative for nausea and vomiting. Head: Normocephalic and atraumatic.       Right Ear: Tympanic membrane and external ear normal.      Left Ear: Tympanic membrane and external ear normal.      Nose: Nose normal.      Mouth/Throat:      Mouth: Mucous membranes are moist.   Eyes:      Ex minutes three times weekly.    Health maintenance, Up to date    Immunizations: Up to date   Immunization History   Administered Date(s) Administered   • Covid-19 Vaccine Pfizer 30 mcg/0.3 ml 03/22/2021, 03/30/2021, 10/08/2021   • FLU VAC High Dose 65 YRS & and methotrexate.    Return in about 1 year (around 10/20/2022) for Annual physical.

## 2021-11-10 ENCOUNTER — OFFICE VISIT (OUTPATIENT)
Dept: RHEUMATOLOGY | Facility: CLINIC | Age: 66
End: 2021-11-10
Payer: COMMERCIAL

## 2021-11-10 VITALS
SYSTOLIC BLOOD PRESSURE: 158 MMHG | DIASTOLIC BLOOD PRESSURE: 71 MMHG | HEIGHT: 70.5 IN | BODY MASS INDEX: 27.46 KG/M2 | WEIGHT: 194 LBS | HEART RATE: 60 BPM

## 2021-11-10 DIAGNOSIS — Z51.81 THERAPEUTIC DRUG MONITORING: ICD-10-CM

## 2021-11-10 DIAGNOSIS — M19.90 INFLAMMATORY ARTHRITIS: ICD-10-CM

## 2021-11-10 PROCEDURE — 99214 OFFICE O/P EST MOD 30 MIN: CPT | Performed by: INTERNAL MEDICINE

## 2021-11-10 PROCEDURE — 3008F BODY MASS INDEX DOCD: CPT | Performed by: INTERNAL MEDICINE

## 2021-11-10 PROCEDURE — 3077F SYST BP >= 140 MM HG: CPT | Performed by: INTERNAL MEDICINE

## 2021-11-10 PROCEDURE — 3078F DIAST BP <80 MM HG: CPT | Performed by: INTERNAL MEDICINE

## 2021-11-10 RX ORDER — FOLIC ACID 1 MG/1
1 TABLET ORAL DAILY
Qty: 90 TABLET | Refills: 3 | Status: SHIPPED | OUTPATIENT
Start: 2021-11-10

## 2021-11-10 NOTE — PROGRESS NOTES
Holly Tineo is a 77year old male. HPI:   Patient presents with:   Follow - Up  Test Results    Presents for f/u on 11/10/2021 of Seronegative RA (elevated ESR, +synovitis in wrist on MRI) vs PMR    Current Medications:  MTX 6 pills weekl He now even has some triggering in his R third finger.     Today:  Presentsfor follow-up of Seronegative RA (elevated ESR, +synovitis in wrist on MRI) mostly in his bilateral shoulders and right wrist.  Last visit we injected his right fourth trigger finge on palpation  Hip: normal log roll, no lateral hip pain, ANSHU test negative b/l  Knees: FROM, no warmth or effusion present. No pain with ROM.    Ankles: FROM, no pain or swelling or warmth on palpation  Feet: no pain with MTP squeeze, no toe swelling or p Screen      Nonreactive  Nonreactive   Hbsag Screen Index       <0.10   HEPATITIS B SURFACE AB QUAL      Nonreactive  Nonreactive   HEPATITIS B SURFACE AB QUANT      mIU/mL <3.10   HCV AB      Nonreactive  Nonreactive   HEPATITIS B CORE AB, TOTAL      Nonr joint.    ASSESSMENT/PLAN:     This is a 58 yo M with hx of Basal cell carcinoma referred by his PCP for polyarthralgia's.   Symptoms started in January with intermittent pain and swelling in his right wrist.  Then in May/June progressed his shoulders and h

## 2021-12-06 ENCOUNTER — TELEPHONE (OUTPATIENT)
Dept: RHEUMATOLOGY | Facility: CLINIC | Age: 66
End: 2021-12-06

## 2022-03-10 ENCOUNTER — PATIENT MESSAGE (OUTPATIENT)
Dept: RHEUMATOLOGY | Facility: CLINIC | Age: 67
End: 2022-03-10

## 2022-03-10 NOTE — TELEPHONE ENCOUNTER
From: Oliva Fields III  To: Vera Keita MD  Sent: 3/10/2022 11:30 AM CST  Subject: When is my next appointment? This note is to check in to see when my next appointment is scheduled with Dr Shy Ornelas?      Thanks,     Kimmy Vela RESTING ON BED, SITTER AT BED SIDE, PENDING SS D/C PROCESS AS REPORTED BY NACHO MENDEZ, WILL 
CONTINUE FOLLOW AND MONITORING.

## 2022-05-10 RX ORDER — ATORVASTATIN CALCIUM 10 MG/1
TABLET, FILM COATED ORAL
Qty: 90 TABLET | Refills: 1 | Status: SHIPPED | OUTPATIENT
Start: 2022-05-10

## 2022-06-10 ENCOUNTER — PATIENT MESSAGE (OUTPATIENT)
Dept: RHEUMATOLOGY | Facility: CLINIC | Age: 67
End: 2022-06-10

## 2022-06-10 NOTE — TELEPHONE ENCOUNTER
From: Nile Benitez III  To: Christy Calloway MD  Sent: 6/10/2022 9:46 AM CDT  Subject: Changing my Methotrexate Day    Helfavio Chow,     I would like to change my Methotrexate Day from Saturday morning to Monday evening. I plan on making this change this weekend. For this one time, it would add 2 days to the interval. Afterward, I will stay on Monday evening. Do you see any issue doing this?      Thanks,     Ceferino Barrow

## 2022-06-29 DIAGNOSIS — E78.2 MIXED HYPERLIPIDEMIA: ICD-10-CM

## 2022-06-29 RX ORDER — ATORVASTATIN CALCIUM 20 MG/1
TABLET, FILM COATED ORAL
Qty: 90 TABLET | Refills: 0 | Status: SHIPPED | OUTPATIENT
Start: 2022-06-29

## 2022-06-29 NOTE — TELEPHONE ENCOUNTER
Pt is completely out of this med. Somehow the script got refilled at 10mg instead of 20, so he has been doubling up on them. Now he is out. It was his understanding that he take 20mg. That is what was ordered after his LOV.     atorvastatin 20 MG Oral Tab 90 tablet 3 10/20/2021

## 2022-08-04 ENCOUNTER — PATIENT MESSAGE (OUTPATIENT)
Dept: RHEUMATOLOGY | Facility: CLINIC | Age: 67
End: 2022-08-04

## 2022-08-04 DIAGNOSIS — M19.90 INFLAMMATORY ARTHRITIS: ICD-10-CM

## 2022-08-04 DIAGNOSIS — Z51.81 THERAPEUTIC DRUG MONITORING: ICD-10-CM

## 2022-08-05 NOTE — TELEPHONE ENCOUNTER
From: Aydee Champion III  To: César Whitaker MD  Sent: 8/4/2022 5:27 PM CDT  Subject: Methotrexate Prescription     Hello, I hope all is well. Can you send my prescription to [facility] in [place]?      Thanks,     Judith Yi (Kimberlee Osborn) [place]

## 2022-08-05 NOTE — TELEPHONE ENCOUNTER
LOV:11/10/2021 Order labs for [facility] and have pt schedule follow up appointment? Future Appointments   Date Time Provider Michelle Barrios   11/18/2022  9:30 AM Rosina Joya MD [facility]     Labs:   Component      Latest Ref Rng & Units 10/14/2021   WBC      3.8 - 10.8 Thousand/uL 5.9   RBC      4.20 - 5.80 Million/uL 4.93   Hemoglobin      13.2 - 17.1 g/dL 14.7   Hematocrit      38.5 - 50.0 % 44.2   MCV      80.0 - 100.0 fL 89.7   MCH      27.0 - 33.0 pg 29.8   MCHC      32.0 - 36.0 g/dL 33.3   RDW      11.0 - 15.0 % 12.7   Platelet Count      683 - 400 Thousand/uL 369   MPV      7.5 - 12.5 fL 10.2   Neutrophils Absolute      1,500 - 7,800 cells/uL 2,844   Lymphocytes Absolute      850 - 3,900 cells/uL 1,788   Monocytes Absolute      200 - 950 cells/uL 620   Eosinophils Absolute      15 - 500 cells/uL 590 (H)   Basophils Absolute      0 - 200 cells/uL 59   Neutrophils %      % 48.2   Lymphocytes %      % 30.3   Monocytes %      % 10.5   Eosinophils %      % 10.0   Basophils %      % 1.0   CREATININE      0.70 - 1.25 mg/dL 0.93   eGFR NON-AFR.  AMERICAN      > OR = 60 mL/min/1.73m2 85   eGFR AFRICAN AMERICAN      > OR = 60 mL/min/1.73m2 99   Albumin      3.6 - 5.1 g/dL 4.1   ALT (SGPT)      9 - 46 U/L 32   C-REACTIVE PROTEIN      <8.0 mg/L 0.6   SED RATE BY MODIFIED$WESTERGREN      < OR = 20 mm/h 6   AST (SGOT)      10 - 35 U/L 23

## 2022-08-08 ENCOUNTER — TELEPHONE (OUTPATIENT)
Dept: RHEUMATOLOGY | Facility: CLINIC | Age: 67
End: 2022-08-08

## 2022-08-08 DIAGNOSIS — Z51.81 ENCOUNTER FOR THERAPEUTIC DRUG MONITORING: ICD-10-CM

## 2022-08-08 DIAGNOSIS — M19.90 INFLAMMATORY ARTHRITIS: Primary | ICD-10-CM

## 2022-08-08 NOTE — TELEPHONE ENCOUNTER
Left message orders placed for [facility] and they should be able to see the orders in their system. Call office back with questions or specific location to fax orders.

## 2022-08-08 NOTE — TELEPHONE ENCOUNTER
Patient is calling regarding sending prescription to [facility] in [place]. Patient will be out of medication by tomorrow.

## 2022-08-26 LAB
ABSOLUTE BASOPHILS: 92 CELLS/UL (ref 0–200)
ABSOLUTE EOSINOPHILS: 470 CELLS/UL (ref 15–500)
ABSOLUTE LYMPHOCYTES: 2208 CELLS/UL (ref 850–3900)
ABSOLUTE MONOCYTES: 665 CELLS/UL (ref 200–950)
ABSOLUTE NEUTROPHILS: 2666 CELLS/UL (ref 1500–7800)
ALBUMIN: 4.3 G/DL (ref 3.6–5.1)
ALT: 31 U/L (ref 9–46)
AST: 25 U/L (ref 10–35)
BASOPHILS: 1.5 %
C-REACTIVE PROTEIN: 0.5 MG/L
CREATININE: 1.02 MG/DL (ref 0.7–1.35)
EGFR: 81 ML/MIN/1.73M2
EOSINOPHILS: 7.7 %
HEMATOCRIT: 41.6 % (ref 38.5–50)
HEMOGLOBIN: 14.1 G/DL (ref 13.2–17.1)
LYMPHOCYTES: 36.2 %
MCH: 31.1 PG (ref 27–33)
MCHC: 33.9 G/DL (ref 32–36)
MCV: 91.8 FL (ref 80–100)
MONOCYTES: 10.9 %
MPV: 10.3 FL (ref 7.5–12.5)
NEUTROPHILS: 43.7 %
PLATELET COUNT: 349 THOUSAND/UL (ref 140–400)
RDW: 13.2 % (ref 11–15)
RED BLOOD CELL COUNT: 4.53 MILLION/UL (ref 4.2–5.8)
SED RATE BY MODIFIED$WESTERGREN: 2 MM/H
WHITE BLOOD CELL COUNT: 6.1 THOUSAND/UL (ref 3.8–10.8)

## 2022-09-01 ENCOUNTER — OFFICE VISIT (OUTPATIENT)
Dept: RHEUMATOLOGY | Facility: CLINIC | Age: 67
End: 2022-09-01
Payer: MEDICARE

## 2022-09-01 VITALS
HEART RATE: 57 BPM | BODY MASS INDEX: 27 KG/M2 | DIASTOLIC BLOOD PRESSURE: 80 MMHG | WEIGHT: 192 LBS | SYSTOLIC BLOOD PRESSURE: 155 MMHG

## 2022-09-01 DIAGNOSIS — M19.90 INFLAMMATORY ARTHRITIS: ICD-10-CM

## 2022-09-01 DIAGNOSIS — M65.341 TRIGGER RING FINGER OF RIGHT HAND: Primary | ICD-10-CM

## 2022-09-01 DIAGNOSIS — Z51.81 ENCOUNTER FOR THERAPEUTIC DRUG MONITORING: ICD-10-CM

## 2022-09-01 RX ORDER — METHYLPREDNISOLONE ACETATE 80 MG/ML
20 INJECTION, SUSPENSION INTRA-ARTICULAR; INTRALESIONAL; INTRAMUSCULAR; SOFT TISSUE ONCE
Status: SHIPPED | OUTPATIENT
Start: 2022-09-01

## 2022-09-01 NOTE — PATIENT INSTRUCTIONS
You were seen for inflammatory arthritis  Symptoms are stable  We injected your right fourth trigger finger with cortisone, hopefully it helps  Continue methotrexate and folic acid  Make sure to get blood work every 3 months  Can follow-up in the next 6 months

## 2022-09-01 NOTE — PROCEDURES
With  consent, I injected the patient's R 4th A-1 pulley region with 0.25ml lidocaine  1% and 0.25ml depo 80. It was under sterile technique using iodine and alcohol swabs and ethyl chloride was used as an anaesthetic spray. Pt.  tolerated it well.

## 2022-09-21 ENCOUNTER — PATIENT MESSAGE (OUTPATIENT)
Dept: FAMILY MEDICINE CLINIC | Facility: CLINIC | Age: 67
End: 2022-09-21

## 2022-09-21 NOTE — TELEPHONE ENCOUNTER
From: Nikki Wayne III  To: Isis Lawrence MD  Sent: 9/21/2022 1:28 PM CDT  Subject: COVID Vaccination Record    This note requests that a correction be made to my COVID Vaccination record. There is an entry that was dated incorrectly on 03-. That needs to be deleted. The vaccination occurred on 03-02-21 which is correctly entered. Let me know if you have any questions.      Best Regards,     Olivia Davis ([facility]) Otilio Presybeterian

## 2022-09-26 ENCOUNTER — TELEPHONE (OUTPATIENT)
Dept: FAMILY MEDICINE CLINIC | Facility: CLINIC | Age: 67
End: 2022-09-26

## 2022-09-26 DIAGNOSIS — Z12.5 SCREENING FOR PROSTATE CANCER: ICD-10-CM

## 2022-09-26 DIAGNOSIS — Z00.00 LABORATORY EXAMINATION ORDERED AS PART OF A ROUTINE GENERAL MEDICAL EXAMINATION: ICD-10-CM

## 2022-09-26 NOTE — TELEPHONE ENCOUNTER
Please enter lab orders for the patient's upcoming physical appointment. Physical scheduled: Your appointments     Date & Time Appointment Department [facility])    Nov 16, 2022 11:30 AM CST Medicare Annual Well Visit with Dee Celis MD [facility] [address], [address]  [address], [place]  (Gurdeep Hirsch [address] [facility] [id number]         Preferred lab: [facility]     The patient has been notified to complete fasting labs prior to their physical appointment.

## 2022-09-29 DIAGNOSIS — E78.2 MIXED HYPERLIPIDEMIA: ICD-10-CM

## 2022-09-30 RX ORDER — ATORVASTATIN CALCIUM 20 MG/1
TABLET, FILM COATED ORAL
Qty: 90 TABLET | Refills: 0 | Status: SHIPPED | OUTPATIENT
Start: 2022-09-30

## 2022-10-16 NOTE — TELEPHONE ENCOUNTER
1. Laboratory examination ordered as part of a routine general medical examination  -     Comp Metabolic Panel (14)  -     Lipid Panel  -     TSH W Reflex To Free T4  -     CBC, Platelet; No Differential  2. Screening for prostate cancer  -     PSA Screen; Future; Expected date: 10/16/2022       OK to notify.  Thanks, Shanna Pollock MD

## 2022-11-12 LAB
ALBUMIN/GLOBULIN RATIO: 1.6 (CALC) (ref 1–2.5)
ALBUMIN: 4.3 G/DL (ref 3.6–5.1)
ALKALINE PHOSPHATASE: 73 U/L (ref 35–144)
ALT: 30 U/L (ref 9–46)
AST: 24 U/L (ref 10–35)
BILIRUBIN, TOTAL: 0.8 MG/DL (ref 0.2–1.2)
BUN: 19 MG/DL (ref 7–25)
CALCIUM: 9.4 MG/DL (ref 8.6–10.3)
CARBON DIOXIDE: 26 MMOL/L (ref 20–32)
CHLORIDE: 104 MMOL/L (ref 98–110)
CHOL/HDLC RATIO: 3.1 (CALC)
CHOLESTEROL, TOTAL: 157 MG/DL
CREATININE: 0.82 MG/DL (ref 0.7–1.35)
EGFR: 96 ML/MIN/1.73M2
GLOBULIN: 2.7 G/DL (CALC) (ref 1.9–3.7)
GLUCOSE: 90 MG/DL (ref 65–99)
HDL CHOLESTEROL: 51 MG/DL
HEMATOCRIT: 41.6 % (ref 38.5–50)
HEMOGLOBIN: 14.3 G/DL (ref 13.2–17.1)
LDL-CHOLESTEROL: 92 MG/DL (CALC)
MCH: 31.4 PG (ref 27–33)
MCHC: 34.4 G/DL (ref 32–36)
MCV: 91.2 FL (ref 80–100)
MPV: 10.2 FL (ref 7.5–12.5)
NON-HDL CHOLESTEROL: 106 MG/DL (CALC)
PLATELET COUNT: 349 THOUSAND/UL (ref 140–400)
POTASSIUM: 4.5 MMOL/L (ref 3.5–5.3)
PROTEIN, TOTAL: 7 G/DL (ref 6.1–8.1)
RDW: 13.5 % (ref 11–15)
RED BLOOD CELL COUNT: 4.56 MILLION/UL (ref 4.2–5.8)
SODIUM: 137 MMOL/L (ref 135–146)
TRIGLYCERIDES: 54 MG/DL
TSH W/REFLEX TO FT4: 1.78 MIU/L (ref 0.4–4.5)
WHITE BLOOD CELL COUNT: 6.3 THOUSAND/UL (ref 3.8–10.8)

## 2022-11-14 ENCOUNTER — TELEPHONE (OUTPATIENT)
Dept: FAMILY MEDICINE CLINIC | Facility: CLINIC | Age: 67
End: 2022-11-14

## 2022-11-14 NOTE — TELEPHONE ENCOUNTER
Spoke to patient and he will complete it on My chart, form in tickler for appt in case he did not a[[t 11/16/22 at 11:30 am with Dr. Yuridia Guzman

## 2022-11-16 ENCOUNTER — OFFICE VISIT (OUTPATIENT)
Dept: FAMILY MEDICINE CLINIC | Facility: CLINIC | Age: 67
End: 2022-11-16
Payer: MEDICARE

## 2022-11-16 VITALS
HEART RATE: 74 BPM | WEIGHT: 191.63 LBS | SYSTOLIC BLOOD PRESSURE: 138 MMHG | DIASTOLIC BLOOD PRESSURE: 82 MMHG | RESPIRATION RATE: 18 BRPM | HEIGHT: 70.5 IN | BODY MASS INDEX: 27.13 KG/M2

## 2022-11-16 DIAGNOSIS — H81.10 BENIGN PAROXYSMAL POSITIONAL VERTIGO, UNSPECIFIED LATERALITY: ICD-10-CM

## 2022-11-16 DIAGNOSIS — N63.25 MASS OVERLAPPING MULTIPLE QUADRANTS OF LEFT BREAST: ICD-10-CM

## 2022-11-16 DIAGNOSIS — Z00.00 ENCOUNTER FOR ANNUAL HEALTH EXAMINATION: ICD-10-CM

## 2022-11-16 DIAGNOSIS — R42 VERTIGO: ICD-10-CM

## 2022-11-16 DIAGNOSIS — Z00.00 ANNUAL PHYSICAL EXAM: Primary | ICD-10-CM

## 2022-11-16 DIAGNOSIS — E78.2 MIXED HYPERLIPIDEMIA: ICD-10-CM

## 2022-11-16 DIAGNOSIS — D89.89 AUTOIMMUNE DISORDER (HCC): ICD-10-CM

## 2022-11-16 DIAGNOSIS — I10 ESSENTIAL HYPERTENSION: ICD-10-CM

## 2022-11-16 DIAGNOSIS — Z12.31 ENCOUNTER FOR SCREENING MAMMOGRAM FOR MALIGNANT NEOPLASM OF BREAST: ICD-10-CM

## 2022-11-16 PROCEDURE — G0402 INITIAL PREVENTIVE EXAM: HCPCS | Performed by: FAMILY MEDICINE

## 2022-11-16 PROCEDURE — 99214 OFFICE O/P EST MOD 30 MIN: CPT | Performed by: FAMILY MEDICINE

## 2022-11-16 RX ORDER — ATORVASTATIN CALCIUM 20 MG/1
20 TABLET, FILM COATED ORAL DAILY
Qty: 90 TABLET | Refills: 4 | Status: SHIPPED | OUTPATIENT
Start: 2022-11-16

## 2022-11-16 NOTE — PATIENT INSTRUCTIONS
Nikki Villegas III's SCREENING SCHEDULE   Tests on this list are recommended by your physician but may not be covered, or covered at this frequency, by your insurer. Please check with your insurance carrier before scheduling to verify coverage.    PREVENTATIVE SERVICES FREQUENCY &  COVERAGE DETAILS LAST COMPLETION DATE   Diabetes Screening    Fasting Blood Sugar / Glucose    One screening every 12 months if never tested or if previously tested but not diagnosed with pre-diabetes   One screening every 6 months if diagnosed with pre-diabetes Lab Results   Component Value Date    GLU 90 11/11/2022        Cardiovascular Disease Screening    Lipid Panel  Cholesterol  Lipoprotein (HDL)  Triglycerides Covered every 5 years for all Medicare beneficiaries without apparent signs or symptoms of cardiovascular disease Lab Results   Component Value Date    CHOLEST 157 11/11/2022    HDL 51 11/11/2022    LDL 92 11/11/2022    TRIG 54 11/11/2022         Electrocardiogram (EKG)   Covered if needed at Welcome to Medicare, and non-screening if indicated for medical reasons 08/28/2018      Ultrasound Screening for Abdominal Aortic Aneurysm (AAA) Covered once in a lifetime for one of the following risk factors    Men who are 73-68 years old and have ever smoked    Anyone with a family history -     Colorectal Cancer Screening  Covered for ages 52-80; only need ONE of the following:    Colonoscopy   Covered every 10 years    Covered every 2 years if patient is at high risk or previous colonoscopy was abnormal 12/29/2020    Colorectal Cancer Screening due on 12/29/2023    Flexible Sigmoidoscopy   Covered every 4 years -    Fecal Occult Blood Test Covered annually -   Prostate Cancer Screening    Prostate-Specific Antigen (PSA) Annually No results found for: PSA  PSA due on 12/21/2022   Immunizations    Influenza Covered once per flu season  Please get every year 09/21/2022  No recommendations at this time    Pneumococcal Each vaccine ([id number] & Ewaphlefp35) covered once after 65 Prevnar 13: -    Jvmtceabt20: 08/17/2020     Pneumococcal Vaccination(2 - PCV) due on 08/17/2021    Hepatitis B One screening covered for patients with certain risk factors   -  No recommendations at this time    Tetanus Toxoid Not covered by Medicare Part B unless medically necessary (cut with metal); may be covered with your pharmacy prescription benefits 04/14/2009    Tetanus, Diptheria and Pertusis TD and TDaP Not covered by Medicare Part B -  No recommendations at this time    Zoster Not covered by Medicare Part B; may be covered with your pharmacy  prescription benefits 07/24/2013  Zoster Vaccines(3 of 3) due on 02/05/2020      Recommended Websites for Advanced Directives    SeekAlumni.no. org/publications/Documents/personal_dec. pdf  An information packet, including necessary form from the [employer] website. http://www. idph.state. [place]/public/books/advin.htm  A link to the [employer]. This site has a lot of good information including definitions of the different types of Advance Directives. It also has the State forms available on it's website for anyone to review and print using their home computer and printer. (the forms are also available in [address])  www. [employer]sitinwriting. org  This link also has information from the [address] [place] [address] regarding Advance Directives.

## 2022-12-05 ENCOUNTER — HOSPITAL ENCOUNTER (OUTPATIENT)
Dept: MAMMOGRAPHY | Facility: HOSPITAL | Age: 67
Discharge: HOME | End: 2022-12-05
Attending: FAMILY MEDICINE
Payer: MEDICARE

## 2022-12-05 ENCOUNTER — HOSPITAL ENCOUNTER (OUTPATIENT)
Dept: ULTRASOUND IMAGING | Age: 67
Discharge: HOME | End: 2022-12-05
Attending: FAMILY MEDICINE
Payer: MEDICARE

## 2022-12-05 DIAGNOSIS — N63.25 MASS OVERLAPPING MULTIPLE QUADRANTS OF LEFT BREAST: ICD-10-CM

## 2022-12-05 PROCEDURE — 77066 DX MAMMO INCL CAD BI: CPT | Performed by: FAMILY MEDICINE

## 2022-12-05 PROCEDURE — 77062 BREAST TOMOSYNTHESIS BI: CPT | Performed by: FAMILY MEDICINE

## 2022-12-05 PROCEDURE — 76642 ULTRASOUND BREAST LIMITED: CPT | Performed by: FAMILY MEDICINE

## 2022-12-29 DIAGNOSIS — M19.90 INFLAMMATORY ARTHRITIS: ICD-10-CM

## 2022-12-29 DIAGNOSIS — Z51.81 THERAPEUTIC DRUG MONITORING: ICD-10-CM

## 2023-02-20 DIAGNOSIS — M19.90 INFLAMMATORY ARTHRITIS: ICD-10-CM

## 2023-02-20 DIAGNOSIS — Z51.81 THERAPEUTIC DRUG MONITORING: ICD-10-CM

## 2023-02-20 RX ORDER — FOLIC ACID 1 MG/1
1 TABLET ORAL DAILY
Qty: 90 TABLET | Refills: 3 | Status: SHIPPED | OUTPATIENT
Start: 2023-02-20

## 2023-02-20 NOTE — TELEPHONE ENCOUNTER
Disp Refills Start End    methotrexate 2.5 MG Oral Tab 74 tablet 3 12/30/2022       LOV: 9/1/22  Future Appointments   Date Time Provider Michelle Sophie   5/18/2023 10:15 AM Manuel Boone MD [facility]     Instructions    You were seen for inflammatory arthritis  Symptoms are stable  We injected your right fourth trigger finger with cortisone, hopefully it helps  Continue methotrexate and folic acid  Make sure to get blood work every 3 months  Can follow-up in the next 6 months

## 2023-08-30 ENCOUNTER — TELEPHONE (OUTPATIENT)
Dept: FAMILY MEDICINE CLINIC | Facility: CLINIC | Age: 68
End: 2023-08-30

## 2023-08-30 DIAGNOSIS — Z00.00 LABORATORY EXAMINATION ORDERED AS PART OF A ROUTINE GENERAL MEDICAL EXAMINATION: ICD-10-CM

## 2023-08-30 DIAGNOSIS — E78.2 MIXED HYPERLIPIDEMIA: Primary | ICD-10-CM

## 2023-08-30 DIAGNOSIS — Z12.5 SCREENING FOR PROSTATE CANCER: ICD-10-CM

## 2023-08-30 DIAGNOSIS — D89.89 AUTOIMMUNE DISORDER (HCC): ICD-10-CM

## 2023-09-19 ENCOUNTER — TELEPHONE (OUTPATIENT)
Dept: RHEUMATOLOGY | Facility: CLINIC | Age: 68
End: 2023-09-19

## 2023-09-19 DIAGNOSIS — Z51.81 ENCOUNTER FOR THERAPEUTIC DRUG MONITORING: ICD-10-CM

## 2023-09-19 DIAGNOSIS — M19.90 INFLAMMATORY ARTHRITIS: Primary | ICD-10-CM

## 2023-09-19 NOTE — TELEPHONE ENCOUNTER
Pt states that he is due to get blood work for Dr. Janeth Nicole. Per the patient he goes to [facility] to get it done and would like know if the orders can be faxed to [facility] at fax: [phone]. Per the patient he would like to [facility] tomorrow morning at 8:30. Please, call the patient with any questions.

## 2023-09-22 LAB
ABSOLUTE BASOPHILS: 99 CELLS/UL (ref 0–200)
ABSOLUTE EOSINOPHILS: 552 CELLS/UL (ref 15–500)
ABSOLUTE LYMPHOCYTES: 2499 CELLS/UL (ref 850–3900)
ABSOLUTE MONOCYTES: 713 CELLS/UL (ref 200–950)
ABSOLUTE NEUTROPHILS: 2337 CELLS/UL (ref 1500–7800)
ALBUMIN: 4.4 G/DL (ref 3.6–5.1)
ALT: 35 U/L (ref 9–46)
AST: 29 U/L (ref 10–35)
BASOPHILS: 1.6 %
C-REACTIVE PROTEIN: 0.3 MG/L
CREATININE: 1.08 MG/DL (ref 0.7–1.35)
EGFR: 75 ML/MIN/1.73M2
EOSINOPHILS: 8.9 %
HEMATOCRIT: 43.2 % (ref 38.5–50)
HEMOGLOBIN: 14.8 G/DL (ref 13.2–17.1)
LYMPHOCYTES: 40.3 %
MCH: 31.8 PG (ref 27–33)
MCHC: 34.3 G/DL (ref 32–36)
MCV: 92.7 FL (ref 80–100)
MONOCYTES: 11.5 %
MPV: 10 FL (ref 7.5–12.5)
NEUTROPHILS: 37.7 %
PLATELET COUNT: 329 THOUSAND/UL (ref 140–400)
RDW: 12.7 % (ref 11–15)
RED BLOOD CELL COUNT: 4.66 MILLION/UL (ref 4.2–5.8)
SED RATE BY MODIFIED$WESTERGREN: 6 MM/H
WHITE BLOOD CELL COUNT: 6.2 THOUSAND/UL (ref 3.8–10.8)

## 2023-09-29 ENCOUNTER — TELEPHONE (OUTPATIENT)
Dept: FAMILY MEDICINE CLINIC | Facility: CLINIC | Age: 68
End: 2023-09-29

## 2023-09-29 ENCOUNTER — TELEPHONE (OUTPATIENT)
Dept: RHEUMATOLOGY | Facility: CLINIC | Age: 68
End: 2023-09-29

## 2023-09-29 DIAGNOSIS — E78.2 MIXED HYPERLIPIDEMIA: ICD-10-CM

## 2023-09-29 RX ORDER — METHOTREXATE 2.5 MG/1
15 TABLET ORAL WEEKLY
Qty: 6 TABLET | Refills: 0 | Status: SHIPPED | OUTPATIENT
Start: 2023-09-29

## 2023-09-29 RX ORDER — ATORVASTATIN CALCIUM 20 MG/1
20 TABLET, FILM COATED ORAL DAILY
Qty: 90 TABLET | Refills: 4 | Status: SHIPPED | OUTPATIENT
Start: 2023-09-29

## 2023-09-29 RX ORDER — FOLIC ACID 1 MG/1
1 TABLET ORAL DAILY
Qty: 10 TABLET | Refills: 0 | Status: SHIPPED | OUTPATIENT
Start: 2023-09-29

## 2023-09-29 NOTE — TELEPHONE ENCOUNTER
Pt states that he traveled and forgot to take his medication with him. Pt would like to know if the doctor can send in a script for his methotrexate medication. Per the patient he is due to take his medication on Tuesday and is requesting 6 tablets. Pt is also looking for a script to be sent in for his folic acid 10 day supply. to Pharmacy: [facility]: address: [address] [place] [address], [address] [place] [address] Phone: [phone]  Fax: not provided    Current Outpatient Medications   Medication Sig Dispense Refill    methotrexate 2.5 MG Oral Tab Take 6 tablets (15 mg total) by mouth every 7 days. 74 tablet 0    folic acid 1 MG Oral Tab Take 1 tablet (1 mg total) by mouth daily.  90 tablet 3

## 2023-09-29 NOTE — TELEPHONE ENCOUNTER
Pt call requesting medication because he forgot it when travel       atorvastatin 20 MG Oral Tab       [facility]    [address] [place], [place], [address] [facility]      [phone]

## 2023-10-19 ENCOUNTER — OFFICE VISIT (OUTPATIENT)
Dept: RHEUMATOLOGY | Facility: CLINIC | Age: 68
End: 2023-10-19

## 2023-10-19 VITALS
HEIGHT: 70 IN | DIASTOLIC BLOOD PRESSURE: 78 MMHG | BODY MASS INDEX: 28.6 KG/M2 | HEART RATE: 60 BPM | WEIGHT: 199.81 LBS | SYSTOLIC BLOOD PRESSURE: 138 MMHG

## 2023-10-19 DIAGNOSIS — Z51.81 THERAPEUTIC DRUG MONITORING: ICD-10-CM

## 2023-10-19 DIAGNOSIS — Z51.81 ENCOUNTER FOR THERAPEUTIC DRUG MONITORING: ICD-10-CM

## 2023-10-19 DIAGNOSIS — M19.90 INFLAMMATORY ARTHRITIS: Primary | ICD-10-CM

## 2023-10-19 DIAGNOSIS — M65.341 TRIGGER RING FINGER OF RIGHT HAND: ICD-10-CM

## 2023-10-19 PROCEDURE — 99214 OFFICE O/P EST MOD 30 MIN: CPT | Performed by: INTERNAL MEDICINE

## 2023-10-19 PROCEDURE — 20550 NJX 1 TENDON SHEATH/LIGAMENT: CPT | Performed by: INTERNAL MEDICINE

## 2023-10-19 RX ORDER — METHYLPREDNISOLONE ACETATE 80 MG/ML
20 INJECTION, SUSPENSION INTRA-ARTICULAR; INTRALESIONAL; INTRAMUSCULAR; SOFT TISSUE ONCE
Status: COMPLETED | OUTPATIENT
Start: 2023-10-19 | End: 2023-10-19

## 2023-10-19 RX ORDER — FOLIC ACID 1 MG/1
1 TABLET ORAL DAILY
Qty: 90 TABLET | Refills: 3 | Status: SHIPPED | OUTPATIENT
Start: 2023-10-19

## 2023-10-19 RX ORDER — FOLIC ACID 1 MG/1
1 TABLET ORAL DAILY
Qty: 90 TABLET | Refills: 3 | Status: SHIPPED | OUTPATIENT
Start: 2023-10-19 | End: 2023-10-19

## 2023-10-19 RX ORDER — METHOTREXATE 2.5 MG/1
15 TABLET ORAL
Qty: 74 TABLET | Refills: 3 | Status: SHIPPED | OUTPATIENT
Start: 2023-10-19

## 2023-10-19 NOTE — PROCEDURES
With  consent, I injected the patient's R 4th tendon sheath with 0.25ml lidocaine  1% and 0.25ml depo 80. It was under sterile technique using iodine and alcohol swabs and ethyl chloride was used as an anaesthetic spray. Pt.  tolerated it well.

## 2023-10-19 NOTE — PROGRESS NOTES
Wilder Bajwa is a 76year old male. HPI:   Patient presents with:  Finger Pain: Right ring finger    Presents for f/u on 10/19/2023 of Seronegative RA (elevated ESR, +synovitis in wrist on MRI) vs PMR    Current Medications:  MTX 6 pills weekly, FA daily- started sept 2019  Blood work:  Neg HARDY, RF, CCP, CRP, HLA-B27  ESR 34--> normal  MRI R wrist showing nonspecific radiocarpal joint effusion/synovitis, trace 2nd and 4th tensor compartment tenosynovitis. Interval History: This is a 60 yo M with hx of Basal cell carcinoma presents for f/u for polyarthralgia's. He started to experience R wrist pain with intermittent swelling back in January 2019. It would be hard for him to do a push-up due to the wrist pain. Now reports restricted extension and flexion secondary to the pain. He then developed bilateral shoulder and hip girdle pain starting in May/June 2019. He states that he has full range of motion but would feel stiffness when raising his arms. R worse than L. He also reported bilateral hip girdle pain and stiffness. At times will be hard for him to get in and out of the car due to the pain. Denies any trauma or fall. He wouldlcontinue to work out and do the elliptical daily for about 30 minutes. He states that his joints felt better after working out, states that Wesley felt warmed up. \"  Denies any lower back pain. On examination he has limited range of motion his right wrist.  MRI also showed evidence of effusion and synovitis in his right wrist.    7/15/2021:  Presentsfor follow-up of Seronegative RA (elevated ESR, +synovitis in wrist on MRI) vs PMR mostly in his bilateral shoulders and right wrist.   Normal CBC, LFTs, ESR and CRP  He currently is on methotrexate 6 pills weekly and folic acid daily. Doing well, no wrist pain. No shoulder pain  His last visit in September he had right fourth trigger finger. We injected it and it helped.   His symptoms came back a couple of months ago.  He now even has some triggering in his R third finger. 11/10/2021:  Presentsfor follow-up of Seronegative RA (elevated ESR, +synovitis in wrist on MRI) mostly in his bilateral shoulders and right wrist.  Last visit we injected his right fourth trigger finger with cortisone  R 4th finger still triggers but not as much, able to play guitar  Cant do push ups  R shoulder doing well  No swelling or stiffness     9/1/2022:  Presentsfor follow-up of Seronegative RA (elevated ESR, +synovitis in wrist on MRI) mostly in his bilateral shoulders and right wrist.  On methotrexate 6 pills weekly  Joints doing well, no pain, no swelling  R 4th finger is getting stuck  Playing the guitar more since being retired     10/19/2023:  Presentsfor follow-up of Seronegative RA (elevated ESR, +synovitis in wrist on MRI) mostly in his bilateral shoulders and right wrist.  On methotrexate 6 pills weekly  Joints doing well, no pain, no swelling  Right 4th finger was injected with cortisone and did help but coming back, still having some triggering but not as bad  Playing the guitar more since being retired   Recent blood work looked good        HISTORY:  Past Medical History:   Diagnosis Date    Abdominal hernia     Arthritis Spring 2019    Rheumatoid Arthritis being successfully treated    Cancer ([address]     basal cell on face and scalp    Flatulence/gas pain/belching     Hearing loss Long time ago    Gradual with age; not treated    High blood pressure     High cholesterol     Malignant hyperthermia     Family history of MH    Pain in joints Spring 2019    Due to RA    RA (rheumatoid arthritis) ([address]       Social Hx Reviewed   Family Hx Reviewed     Medications (Active prior to today's visit):  Current Outpatient Medications   Medication Sig Dispense Refill    atorvastatin 20 MG Oral Tab Take 1 tablet (20 mg total) by mouth daily. 90 tablet 4    methotrexate 2.5 MG Oral Tab Take 6 tablets (15 mg total) by mouth once a week.  6 tablet 0 folic acid 1 MG Oral Tab Take 1 tablet (1 mg total) by mouth daily. 10 tablet 0    folic acid 1 MG Oral Tab Take 1 tablet (1 mg total) by mouth daily. 90 tablet 3    methotrexate 2.5 MG Oral Tab Take 6 tablets (15 mg total) by mouth every 7 days. 74 tablet 0     .cmed  Allergies:  No Known Allergies      ROS:   All other ROS are negative. PHYSICAL EXAM:   GEN: AAOx3, NAD  HEENT: EOMI, PERRLA, no injection or icterus, oral mucosa moist, no oral lesions. No lymphadenopathy. No facial rash  CVS: RRR, no murmurs rubs or gallops. Equal 2+ distal pulses. LUNGS: CTAB, no increased work of breathing  ABDOMEN:  soft NT/ND, +BS, no HSM  SKIN: No rashes or skin lesions. No nail findings  MSK:  Cervical spine: FROM  Hands: R 4th trigger finger  Wrist: mild chronic synovitis in R wrist  Elbow: FROM, no pain or swelling or warmth on palpation  Shoulders: FROM, no pain or swelling or warmth on palpation  Hip: normal log roll, no lateral hip pain, ANSHU test negative b/l  Knees: FROM, no warmth or effusion present. No pain with ROM. Ankles: FROM, no pain or swelling or warmth on palpation  Feet: no pain with MTP squeeze, no toe swelling or pain or warmth on palpation with FROM  Spine: no lumbar or sacral pain on palpation. NEURO: Cranial nerves II-XII intact grossly. 5/5 strength throughout in both upper and lower extremities, sensation intact.   PSYCH: normal mood         LABS:     Component      Latest Ref Rng & Units 2/13/2020   WHITE BLOOD CELL COUNT      3.8 - 10.8 Thousand/uL 9.3   RED BLOOD CELL COUNT      4.20 - 5.80 Million/uL 4.64   Hemoglobin      13.2 - 17.1 g/dL 14.5   Hematocrit      38.5 - 50.0 % 40.9   MCV      80.0 - 100.0 fL 88.1   MCH      27.0 - 33.0 pg 31.3   MCHC      32.0 - 36.0 g/dL 35.5   RDW      11.0 - 15.0 % 13.1   Platelet Count      364 - 400 Thousand/uL 324   MPV      7.5 - 12.5 fL 10.7   NEUTROPHILS ABSOLUTE      1,500 - 7,800 cells/uL 6,380   ABSOLUTE LYMPHOCYTES      850 - 3,900 cells/uL 1,535   ABSOLUTE MONOCYTES      200 - 950 cells/uL 874   ABSOLUTE EOSINOPHILS      15 - 500 cells/uL 419   ABSOLUTE BASOPHILS      0 - 200 cells/uL 93   NEUTROPHILS      % 68.6   LYMPHOCYTES      % 16.5   MONOCYTES      % 9.4   EOSINOPHILS      % 4.5   BASOPHILS      % 1.0   CREATININE      0.70 - 1.25 mg/dL 1.02   eGFR NON-AFR. AMERICAN      > OR = 60 mL/min/1.73m2 77   eGFR AFRICAN AMERICAN      > OR = 60 mL/min/1.73m2 90   Albumin      3.6 - 5.1 g/dL 4.5   ALT (SGPT)      9 - 46 U/L 31   AST      10 - 35 U/L 22   C-REACTIVE PROTEIN      <8.0 mg/L 1.3   SED RATE BY MODIFIED$WESTERGREN      < OR = 20 mm/h 6     Component      Latest Ref Rng & Units 9/16/2019   Quantiferon TB NIL      IU/mL 0.04   Quantiferon-TB1 Minus NIL      IU/mL 0.01   Quantiferon-TB2 Minus NIL      IU/mL 0.00   Quantiferon TB Mitogen minus NIL      IU/mL >10.00   QTB. RESULT      Negative Negative   HBSAg Screen      Nonreactive  Nonreactive   Hbsag Screen Index       <0.10   HEPATITIS B SURFACE AB QUAL      Nonreactive  Nonreactive   HEPATITIS B SURFACE AB QUANT      mIU/mL <3.10   HCV AB      Nonreactive  Nonreactive   HEPATITIS B CORE AB, TOTAL      Nonreactive  Nonreactive   GLUCOSE-6-PHOSPHATE DEHYDROGEN      9.9 - 16.6 U/g Hb 12.3   HLA-B27      Negative Negative     Component      Latest Ref Rng & Units 9/16/2019 8/28/2019   HARDY SCREEN, IFA      NEGATIVE  NEGATIVE   RHEUMATOID FACTOR      <14 IU/mL  <14   C-REACTIVE PROTEIN      <8.0 mg/L  7.4   SED RATE BY MODIFIED$WESTERGREN      < OR = 20 mm/h  34 (H)   C-Citrullinated Peptide IgG AB      0.0 - 6.9 U/mL 1.6    URIC ACID      3.5 - 7.2 mg/dL 5.6      Imaging:     MRI R shoulder 12/2019:  Partial-thickness articular surface tears of the supraspinatus and infraspinatus with subacromial subdeltoid bursitis. Calcific tendinosis of the posterior mid footplate of the supraspinatus tendon  Mild intra-articular long head biceps tendinosis with biceps tenosynovitis.   Diffuse glenohumeral and capsular synovitis with extensive edema and enhancement involving the axillary recess, rotator interval, and inferior glenohumeral ligament. This is a nonspecific finding but can be seen with adhesive capsulitis. Nonspecific   inflammatory arthritis is also within the differential and is not excluded. MRI R wrist 9/4/19:  - Nonspecific radiocarpal joint effusion/synovitis and mild diffuse pericapsular edema. Distal radioulnar joint effusion/synovitis. - Trace second and fourth extensor compartment tenosynovitis. No discrete dorsal ganglion cyst.  - Degenerative changes involving the base of the thumb first CMC joint, triscaphe joint, and pisotriquetral joints. - Small perforation within the central TFC disc. XR wrist 8/2019:  X-rays show decreased joint space in the right thumb CMC joint. There is   also decreased joint space in   the thumb MCP joint, IP joint. ASSESSMENT/PLAN:        B/l shoulder, hip and R wrist pain likely Seronegative RA, possible PMR (+MRI wrist with synovitis, elevated ESR)- improved   - MRI of the right wrist consistent with effusion and synovitis  - Blood work showed negative RF, CCP and RP.  ESR was slightly elevated  - symptoms appear to be more consistent with seronegative RA. He responded very well to prednisone   - cont MTX 6 pills weekly and FA daily  - blood work reviewed  - blood work ever 4 mos     R 4th Tenosynovitis  - R 4th and sheath injected back in September 2020, July 2021, 9/2022  - Right fourth finger continues to trigger.  - Right fourth A1 pulley region injected with 0.25 cc of lidocaine and 20 mg of Depo-Medrol in sterile fashion. Patient tolerated pressure well  R shoulder RTC tear, tendonitis and synovitis- resolved  - R shoulder injected with cortisone in jan and completed PT  - Reports significant improvement in pain in his right shoulder. Elevated eosinophils  - He has some allergies, also some hives.   We will continue to monitor    Pt will f/u in 6-9 mos    Juvenal Daly MD  10/19/2023  11:40 AM

## 2023-12-02 LAB
ALBUMIN/GLOBULIN RATIO: 1.3 (CALC) (ref 1–2.5)
ALBUMIN: 4 G/DL (ref 3.6–5.1)
ALKALINE PHOSPHATASE: 76 U/L (ref 35–144)
ALT: 30 U/L (ref 9–46)
AST: 25 U/L (ref 10–35)
BILIRUBIN, TOTAL: 0.7 MG/DL (ref 0.2–1.2)
BUN: 18 MG/DL (ref 7–25)
CALCIUM: 9.2 MG/DL (ref 8.6–10.3)
CARBON DIOXIDE: 29 MMOL/L (ref 20–32)
CHLORIDE: 104 MMOL/L (ref 98–110)
CHOL/HDLC RATIO: 3.1 (CALC)
CHOLESTEROL, TOTAL: 169 MG/DL
CREATININE: 0.84 MG/DL (ref 0.7–1.35)
EGFR: 95 ML/MIN/1.73M2
GLOBULIN: 3 G/DL (CALC) (ref 1.9–3.7)
GLUCOSE: 95 MG/DL (ref 65–99)
HDL CHOLESTEROL: 55 MG/DL
HEMATOCRIT: 41.5 % (ref 38.5–50)
HEMOGLOBIN: 13.9 G/DL (ref 13.2–17.1)
LDL-CHOLESTEROL: 99 MG/DL (CALC)
MCH: 31 PG (ref 27–33)
MCHC: 33.5 G/DL (ref 32–36)
MCV: 92.4 FL (ref 80–100)
MPV: 10 FL (ref 7.5–12.5)
NON-HDL CHOLESTEROL: 114 MG/DL (CALC)
PLATELET COUNT: 327 THOUSAND/UL (ref 140–400)
POTASSIUM: 4.1 MMOL/L (ref 3.5–5.3)
PROTEIN, TOTAL: 7 G/DL (ref 6.1–8.1)
PSA, TOTAL: 4.22 NG/ML
RDW: 12.7 % (ref 11–15)
RED BLOOD CELL COUNT: 4.49 MILLION/UL (ref 4.2–5.8)
SODIUM: 139 MMOL/L (ref 135–146)
TRIGLYCERIDES: 68 MG/DL
TSH W/REFLEX TO FT4: 2.71 MIU/L (ref 0.4–4.5)
WHITE BLOOD CELL COUNT: 6.6 THOUSAND/UL (ref 3.8–10.8)

## 2023-12-05 NOTE — ASSESSMENT & PLAN NOTE
Cholesterol shows Good control. Long term heart-healthy diet and lifestyle discussed and encouraged to reduce risk of cardiovascular disease. Cholesterol: 169, done on 12/1/2023. HDL Cholesterol: 55, done on 12/1/2023. TriGlycerides 68, done on 12/1/2023. LDL Cholesterol: 99, done on 12/1/2023. Cholesterol medications include atorvastatin 20 MG Oral Tab [[id number]].

## 2023-12-06 ENCOUNTER — OFFICE VISIT (OUTPATIENT)
Dept: FAMILY MEDICINE CLINIC | Facility: CLINIC | Age: 68
End: 2023-12-06
Payer: MEDICARE

## 2023-12-06 VITALS
RESPIRATION RATE: 16 BRPM | HEIGHT: 70 IN | DIASTOLIC BLOOD PRESSURE: 70 MMHG | SYSTOLIC BLOOD PRESSURE: 136 MMHG | HEART RATE: 72 BPM | WEIGHT: 202.19 LBS | BODY MASS INDEX: 28.95 KG/M2

## 2023-12-06 DIAGNOSIS — E78.2 MIXED HYPERLIPIDEMIA: ICD-10-CM

## 2023-12-06 DIAGNOSIS — I10 ESSENTIAL HYPERTENSION: ICD-10-CM

## 2023-12-06 DIAGNOSIS — Z00.00 ENCOUNTER FOR ANNUAL HEALTH EXAMINATION: ICD-10-CM

## 2023-12-06 DIAGNOSIS — Z23 NEED FOR VACCINATION: ICD-10-CM

## 2023-12-06 DIAGNOSIS — R97.20 ELEVATED PSA, LESS THAN 10 NG/ML: ICD-10-CM

## 2023-12-06 DIAGNOSIS — F51.01 PRIMARY INSOMNIA: ICD-10-CM

## 2023-12-06 DIAGNOSIS — D89.89 AUTOIMMUNE DISORDER (HCC): ICD-10-CM

## 2023-12-06 DIAGNOSIS — Z00.00 ANNUAL PHYSICAL EXAM: Primary | ICD-10-CM

## 2023-12-06 PROCEDURE — G0438 PPPS, INITIAL VISIT: HCPCS | Performed by: FAMILY MEDICINE

## 2023-12-06 PROCEDURE — 90677 PCV20 VACCINE IM: CPT | Performed by: FAMILY MEDICINE

## 2023-12-06 PROCEDURE — G0009 ADMIN PNEUMOCOCCAL VACCINE: HCPCS | Performed by: FAMILY MEDICINE

## 2023-12-06 PROCEDURE — 99214 OFFICE O/P EST MOD 30 MIN: CPT | Performed by: FAMILY MEDICINE

## 2024-03-13 RX ORDER — MULTIVIT-MIN/IRON FUM/FOLIC AC 7.5 MG-4
1 TABLET ORAL DAILY
COMMUNITY

## 2024-03-18 DIAGNOSIS — Z51.81 THERAPEUTIC DRUG MONITORING: ICD-10-CM

## 2024-03-18 DIAGNOSIS — M19.90 INFLAMMATORY ARTHRITIS: ICD-10-CM

## 2024-03-18 DIAGNOSIS — E78.2 MIXED HYPERLIPIDEMIA: ICD-10-CM

## 2024-03-18 RX ORDER — FOLIC ACID 1 MG/1
1 TABLET ORAL DAILY
Qty: 90 TABLET | Refills: 3 | Status: SHIPPED | OUTPATIENT
Start: 2024-03-18

## 2024-03-18 NOTE — TELEPHONE ENCOUNTER
Requested Prescriptions     Pending Prescriptions Disp Refills    folic acid 1 MG Oral Tab 90 tablet 3     Sig: Take 1 tablet (1 mg total) by mouth daily.       Future Appointments   Date Time Provider Department Center   4/4/2024 12:00 PM [facility], PROCEDURE [facility] None   6/6/2024 11:00 AM Dhaval Marie MD [facility] 3 [facility]   6/18/2024 11:30 AM Santos Agudelo MD [facility]     LOV: 10/19/23  Last Refilled:10/19/23 #90 3RF  Labs:  Component      Latest Ref Rng 9/20/2023   WBC      3.8 - 10.8 Thousand/uL 6.2    RBC      4.20 - 5.80 Million/uL 4.66    Hemoglobin      13.2 - 17.1 g/dL 14.8    Hematocrit      38.5 - 50.0 % 43.2    MCV      80.0 - 100.0 fL 92.7    MCH      27.0 - 33.0 pg 31.8    MCHC      32.0 - 36.0 g/dL 34.3    RDW      11.0 - 15.0 % 12.7    Platelet Count      140 - 400 Thousand/uL 329    MPV      7.5 - 12.5 fL 10.0    Neutrophils Absolute      1,500 - 7,800 cells/uL 2,337    Lymphocytes Absolute      850 - 3,900 cells/uL 2,499    Monocytes Absolute      200 - 950 cells/uL 713    Eosinophils Absolute      15 - 500 cells/uL 552 (H)    Basophils Absolute      0 - 200 cells/uL 99    Neutrophils %      % 37.7    Lymphocytes %      % 40.3    Monocytes %      % 11.5    Eosinophils %      % 8.9    Basophils %      % 1.6    CREATININE      0.70 - 1.35 mg/dL 1.08    EGFR      > OR = 60 mL/min/1.73m2 75    Albumin      3.6 - 5.1 g/dL 4.4    ALT (SGPT)      9 - 46 U/L 35    AST (SGOT)      10 - 35 U/L 29    C-REACTIVE PROTEIN      <8.0 mg/L 0.3    SED RATE BY MODIFIED$WESTERGREN      < OR = 20 mm/h 6       Legend:  (H) High    ASSESSMENT/PLAN:         B/l shoulder, hip and R wrist pain likely Seronegative RA, possible PMR (+MRI wrist with synovitis, elevated ESR)- improved   - MRI of the right wrist consistent with effusion and synovitis  - Blood work showed negative RF, CCP and RP.  ESR was slightly elevated  - symptoms appear to be more consistent with seronegative RA.  He responded very well to  prednisone   - cont MTX 6 pills weekly and FA daily  - blood work reviewed  - blood work ever 4 mos      R 4th Tenosynovitis  - R 4th and sheath injected back in September 2020, July 2021, 9/2022  - Right fourth finger continues to trigger.  - Right fourth A1 pulley region injected with 0.25 cc of lidocaine and 20 mg of Depo-Medrol in sterile fashion.  Patient tolerated pressure well  R shoulder RTC tear, tendonitis and synovitis- resolved  - R shoulder injected with cortisone in jan and completed PT  - Reports significant improvement in pain in his right shoulder.  Elevated eosinophils  - He has some allergies, also some hives.  We will continue to monitor     Pt will f/u in 6-9 mos     Marie Garcia MD  10/19/2023  11:40 AM

## 2024-03-19 RX ORDER — ATORVASTATIN CALCIUM 20 MG/1
20 TABLET, FILM COATED ORAL DAILY
Qty: 90 TABLET | Refills: 1 | Status: SHIPPED | OUTPATIENT
Start: 2024-03-19

## 2024-03-19 NOTE — TELEPHONE ENCOUNTER
Requested Prescriptions     Pending Prescriptions Disp Refills    ATORVASTATIN 20 MG Oral Tab [Pharmacy Med Name: ATORVASTATIN 20MG TABLETS] 90 tablet 4     Sig: TAKE 1 TABLET(20 MG) BY MOUTH DAILY     LOV 12/6/2023     Patient was asked to follow-up in: 6 months    Appointment scheduled: 6/6/2024 Dhaval Marie MD     Medication refilled per protocol.

## 2024-03-26 ENCOUNTER — PATIENT MESSAGE (OUTPATIENT)
Dept: RHEUMATOLOGY | Facility: CLINIC | Age: 69
End: 2024-03-26

## 2024-03-26 DIAGNOSIS — Z51.81 ENCOUNTER FOR THERAPEUTIC DRUG MONITORING: ICD-10-CM

## 2024-03-26 DIAGNOSIS — M19.90 INFLAMMATORY ARTHRITIS: Primary | ICD-10-CM

## 2024-03-26 NOTE — TELEPHONE ENCOUNTER
From: William Lazarus Solomon III  To: Marie Garcia  Sent: 3/26/2024 12:05 AM CDT  Subject: Lab Test Orders for Upcoming visit to [facility]    I have an appointment at [facility] in [place] on Friday March 29th for my regular blood tests.     Can you ensure that the orders are in their system before Friday?     Thanks,     Rio Melendez (Bill)

## 2024-04-01 LAB
ABSOLUTE BASOPHILS: 70 CELLS/UL (ref 0–200)
ABSOLUTE EOSINOPHILS: 423 CELLS/UL (ref 15–500)
ABSOLUTE LYMPHOCYTES: 1926 CELLS/UL (ref 850–3900)
ABSOLUTE MONOCYTES: 557 CELLS/UL (ref 200–950)
ABSOLUTE NEUTROPHILS: 2825 CELLS/UL (ref 1500–7800)
ALBUMIN: 4.3 G/DL (ref 3.6–5.1)
ALT: 26 U/L (ref 9–46)
AST: 26 U/L (ref 10–35)
BASOPHILS: 1.2 %
C-REACTIVE PROTEIN: 0.4 MG/L
CREATININE: 0.93 MG/DL (ref 0.7–1.35)
EGFR: 89 ML/MIN/1.73M2
EOSINOPHILS: 7.3 %
HEMATOCRIT: 42.7 % (ref 38.5–50)
HEMOGLOBIN: 14.3 G/DL (ref 13.2–17.1)
LYMPHOCYTES: 33.2 %
MCH: 30.9 PG (ref 27–33)
MCHC: 33.5 G/DL (ref 32–36)
MCV: 92.2 FL (ref 80–100)
MONOCYTES: 9.6 %
MPV: 10.4 FL (ref 7.5–12.5)
NEUTROPHILS: 48.7 %
PLATELET COUNT: 324 THOUSAND/UL (ref 140–400)
RDW: 12.6 % (ref 11–15)
RED BLOOD CELL COUNT: 4.63 MILLION/UL (ref 4.2–5.8)
SED RATE BY MODIFIED$WESTERGREN: 2 MM/H
WHITE BLOOD CELL COUNT: 5.8 THOUSAND/UL (ref 3.8–10.8)

## 2024-05-08 ENCOUNTER — ANESTHESIA EVENT (OUTPATIENT)
Dept: ENDOSCOPY | Facility: HOSPITAL | Age: 69
End: 2024-05-08
Payer: MEDICARE

## 2024-05-08 ENCOUNTER — ANESTHESIA (OUTPATIENT)
Dept: ENDOSCOPY | Facility: HOSPITAL | Age: 69
End: 2024-05-08
Payer: MEDICARE

## 2024-05-08 ENCOUNTER — HOSPITAL ENCOUNTER (OUTPATIENT)
Facility: HOSPITAL | Age: 69
Setting detail: HOSPITAL OUTPATIENT SURGERY
Discharge: HOME | End: 2024-05-08
Attending: INTERNAL MEDICINE | Admitting: INTERNAL MEDICINE
Payer: MEDICARE

## 2024-05-08 VITALS
HEART RATE: 44 BPM | SYSTOLIC BLOOD PRESSURE: 140 MMHG | OXYGEN SATURATION: 94 % | WEIGHT: 194 LBS | RESPIRATION RATE: 16 BRPM | TEMPERATURE: 98 F | DIASTOLIC BLOOD PRESSURE: 70 MMHG | BODY MASS INDEX: 27.77 KG/M2 | HEIGHT: 70 IN

## 2024-05-08 DIAGNOSIS — Z86.010 PERSONAL HISTORY OF COLONIC POLYPS: ICD-10-CM

## 2024-05-08 PROCEDURE — 0DJD8ZZ INSPECTION OF LOWER INTESTINAL TRACT, VIA NATURAL OR ARTIFICIAL OPENING ENDOSCOPIC: ICD-10-PCS | Performed by: INTERNAL MEDICINE

## 2024-05-08 RX ORDER — SODIUM CHLORIDE, SODIUM LACTATE, POTASSIUM CHLORIDE, CALCIUM CHLORIDE 600; 310; 30; 20 MG/100ML; MG/100ML; MG/100ML; MG/100ML
INJECTION, SOLUTION INTRAVENOUS CONTINUOUS
Status: DISCONTINUED | OUTPATIENT
Start: 2024-05-08 | End: 2024-05-08

## 2024-05-08 RX ORDER — LIDOCAINE HYDROCHLORIDE 10 MG/ML
INJECTION, SOLUTION EPIDURAL; INFILTRATION; INTRACAUDAL; PERINEURAL AS NEEDED
Status: DISCONTINUED | OUTPATIENT
Start: 2024-05-08 | End: 2024-05-08 | Stop reason: SURG

## 2024-05-08 RX ORDER — NALOXONE HYDROCHLORIDE 0.4 MG/ML
0.08 INJECTION, SOLUTION INTRAMUSCULAR; INTRAVENOUS; SUBCUTANEOUS ONCE AS NEEDED
Status: DISCONTINUED | OUTPATIENT
Start: 2024-05-08 | End: 2024-05-08

## 2024-05-08 RX ADMIN — SODIUM CHLORIDE, SODIUM LACTATE, POTASSIUM CHLORIDE, CALCIUM CHLORIDE: 600; 310; 30; 20 INJECTION, SOLUTION INTRAVENOUS at 08:26:00

## 2024-05-08 RX ADMIN — SODIUM CHLORIDE, SODIUM LACTATE, POTASSIUM CHLORIDE, CALCIUM CHLORIDE: 600; 310; 30; 20 INJECTION, SOLUTION INTRAVENOUS at 09:02:00

## 2024-05-08 RX ADMIN — LIDOCAINE HYDROCHLORIDE 50 MG: 10 INJECTION, SOLUTION EPIDURAL; INFILTRATION; INTRACAUDAL; PERINEURAL at 08:33:00

## 2024-05-08 NOTE — ANESTHESIA POSTPROCEDURE EVALUATION
[facility]    William Lazarus Solomon III Patient Status:  Hospital Outpatient Surgery   Age/Gender 68 year old male MRN [id number]   Location [facility] ENDOSCOPY [facility]IN [facility] Attending Andreas Costa MD   Hosp Day # 0 PCP Dhaval Marie MD       Anesthesia Post-op Note    COLONOSCOPY    Procedure Summary       Date: 05/08/24 Room / Location:  ENDOSCOPY 02 / [facility] ENDOSCOPY    Anesthesia Start: 0829 Anesthesia Stop: 0902    Procedure: COLONOSCOPY Diagnosis:       Personal history of colonic polyps      (NORMAL)    Surgeons: Andreas Costa MD Anesthesiologist: Jb Saldana MD    Anesthesia Type: MAC ASA Status: 2            Anesthesia Type: MAC    Vitals Value Taken Time   /58 05/08/24 0902   Temp  05/08/24 0904   Pulse 43 05/08/24 0903   Resp 18 05/08/24 0904   SpO2 96 % 05/08/24 0903   Vitals shown include unfiled device data.    Patient Location: Endoscopy    Anesthesia Type: MAC    Airway Patency: patent    Postop Pain Control: adequate    Mental Status: mildly sedated but able to meaningfully participate in the post-anesthesia evaluation    Nausea/Vomiting: none    Cardiopulmonary/Hydration status: stable euvolemic    Complications: no apparent anesthesia related complications    Postop vital signs: stable    Dental Exam: Unchanged from Preop    Patient to be discharged home when criteria met.

## 2024-05-08 NOTE — OPERATIVE REPORT
William Lazarus Solomon III Patient Status:  Hospital Outpatient Surgery    1955 MRN [id number]   [facility] ENDOSCOPY [facility]IN CENTER Attending Andreas Costa MD   Date 2024 PCP Dhaval Marie MD     PREOPERATIVE DIAGNOSIS/INDICATION: H/o polyps family h/o colon cancer  POSTOPERTATIVE DIAGNOSIS:   PROCEDURE PERFORMED: COLONOSCOPY  SEDATION: MAC sedation provided by General Anesthesia    TIME OUT WAS PERFORMED    INFORMED CONSENT: Risks, benefits and alternatives to the procedure were explained to the patient including but not limited to bleeding, infection, perforation, adverse drug reactions, pancreatitis and the need for hospitalization and surgery if this occurs, the patient understands and agrees to procedure.  PROCEDURE DESCRIPTION: After careful digital rectal examination a pediatric colonoscope was introduced into the patients rectum, advanced pass the recto sigmoid junction, into the descending colon, splenic flexure, transverse colon, hepatic flexure, ascending colon, cecum and the last 5-10cm of the terminal ileum, confirmed by landmarks, including the appendiceal orifice and ileocecal valve. Careful examination of the above described areas was performed on withdrawal of the endoscope. Retroflexion was performed on the rectum. The patient tolerated the procedure well, there were no immediate complication immediately following the procedure, and the patient was transferred to recovery in stable condition.  QUALITY OF PREPARATION: Dell Bowel Preparation Scale:            -      Right colon 3, Transverse colon 3, Left colon 3   FINDINGS/THERAPEUTICS:  TERMINAL ILEUM: Normal  COLON: Normal  RECOMMENDATIONS:   Post Colonoscopy precautions, watch for bleeding, infection, perforation, adverse drug reactions   Repeat colonoscopy in 5 years.    Andreas Costa MD  2024  8:57 AM

## 2024-05-08 NOTE — H&P
[facility]  Pre-op H and P    William Lazarus Solomon III Patient Status:  Hospital Outpatient Surgery    1955 MRN [id number]   Location [facility] ENDOSCOPY PAIN CENTER Attending Andreas Costa MD   Date 2024 PCP Dhaval Marie MD     CC: H/o polyps    History of Present Illness:  William Lazarus Solomon III is a a(n) 68 year old male. H/o polyps    History:  Past Medical History:    Abdominal hernia    Arthritis    Rheumatoid Arthritis being successfully treated    Cancer (HCC)    basal cell on face and scalp    Family history of malignant hyperthermia    BOTHER    Flatulence/gas pain/belching    Hearing loss    Gradual with age; not treated    High blood pressure    High cholesterol    Malignant hyperthermia    Family history of MH    Pain in joints    Due to RA    RA (rheumatoid arthritis) (HCC)    Visual impairment    GLASSES     Past Surgical History:   Procedure Laterality Date    Colonoscopy  09    [facility]    Colonoscopy N/A 2020    Procedure: COLONOSCOPY;  Surgeon: Andreas Costa MD;  Location:  ENDOSCOPY    Hernia surgery      Hernia repair    Other surgical history      MOHS on scalp    Other surgical history      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 He has never used smokeless tobacco. He reports that he does not currently use alcohol. He reports that he does not use drugs.    Allergies:  No Known  Allergies    Medications:    Current Facility-Administered Medications:     lactated ringers infusion, , Intravenous, Continuous    Physical Exam:    Blood pressure (!) 161/69, pulse 51, temperature 97.9 °F (36.6 °C), temperature source Temporal, resp. rate 16, height 5' 10\" (1.778 m), weight 194 lb (88 kg), SpO2 98%.    General: Appears alert, oriented x3 and in no acute distress.  CV: Normal rate   Lungs: Normal effort   Skin: Warm and dry.  Laboratory Data:       Imaging:      Assessment/Plan/Procedure:  Patient Active Problem List   Diagnosis    Mixed hyperlipidemia    Essential hypertension    Insomnia    Autoimmune disorder (HCC)    Elevated PSA, less than 10 ng/ml       H/o polyps    Plan;  Colonoscopy    Andreas Costa MD  5/8/2024  7:59 AM

## 2024-05-08 NOTE — ANESTHESIA PREPROCEDURE EVALUATION
PRE-OP EVALUATION    Patient Name: William Lazarus Solomon III    Admit Diagnosis: Personal history of colonic polyps [Z86.010]    Pre-op Diagnosis: Personal history of colonic polyps [Z86.010]    COLONOSCOPY    Anesthesia Procedure: COLONOSCOPY    Surgeons and Role:     * Andreas Costa MD - Primary    Pre-op vitals reviewed.  Temp: 97.9 °F (36.6 °C)  Pulse: 51  Resp: 16  BP: 161/69  SpO2: 98 %  Body mass index is 27.84 kg/m².    Current medications reviewed.  Hospital Medications:   lactated ringers infusion   Intravenous Continuous       Outpatient Medications:     Facility-Administered Medications Prior to Admission   Medication Dose Route Frequency Provider Last Rate Last Admin    methylPREDNISolone acetate (DEPO-medrol) 80 MG/ML injection 20 mg  20 mg Intra-articular Once Marie Garcia MD         Medications Prior to Admission   Medication Sig Dispense Refill Last Dose    atorvastatin 20 MG Oral Tab TAKE 1 TABLET(20 MG) BY MOUTH DAILY 90 tablet 1 5/7/2024    folic acid 1 MG Oral Tab Take 1 tablet (1 mg total) by mouth daily. 90 tablet 3 5/7/2024    Omega-3 Fatty Acids (FISH OIL OR) Take by mouth.   Past Week    Turmeric (QC TUMERIC COMPLEX OR) Take by mouth.   Past Week    Coenzyme Q10 (CO Q 10 OR) Take by mouth.   Past Week    Multiple Vitamins-Minerals (MULTI-VITAMIN/MINERALS) Oral Tab Take 1 tablet by mouth daily.   Past Week    RESVERATROL OR Take by mouth.   Past Week    PEG 3350-KCl-Na Bicarb-NaCl 420 g Oral Recon Soln Take as directed by physician 4000 mL 0     methotrexate 2.5 MG Oral Tab Take 6 tablets (15 mg total) by mouth once a week. 6 tablet 0 Past Week       Allergies: Patient has no known allergies.      Anesthesia Evaluation    Patient summary reviewed.    Anesthetic Complications  (+) history of anesthetic complications  History of: malignant hyperthermia       GI/Hepatic/Renal    Negative GI/hepatic/renal ROS.                             Cardiovascular                  (+) hypertension                                      Endo/Other                           (+) arthritis  (+) rheumatoid arthritis     Pulmonary    Negative pulmonary ROS.                       Neuro/Psych    Negative neuro/psych ROS.                          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  Lab Results   Component Value Date    WBC 5.8 03/29/2024    RBC 4.63 03/29/2024    HGB 14.3 03/29/2024    HCT 42.7 03/29/2024    MCV 92.2 03/29/2024    MCH 30.9 03/29/2024    MCHC 33.5 03/29/2024    RDW 12.6 03/29/2024     03/29/2024     Lab Results   Component Value Date    CREATSERUM 0.93 03/29/2024            Airway      Mallampati: II  Mouth opening: >3 FB  TM distance: > 6 cm  Neck ROM: full Cardiovascular      Rhythm: regular  Rate: normal     Dental             Pulmonary      Breath sounds clear to auscultation bilaterally.               Other findings              ASA: 2   Plan: MAC  NPO status verified and patient meets guidelines.    Post-procedure pain management plan discussed with surgeon and  patient.      Plan/risks discussed with: patient                Present on Admission:  **None**

## 2024-07-11 NOTE — ASSESSMENT & PLAN NOTE
12/1/2023: PSA, TOTAL 4.22 (H) stable, continue present management and continue to monitor for progression elevation. Continue to monitor and continue present management with repeat this fall.

## 2024-07-11 NOTE — ASSESSMENT & PLAN NOTE
Cholesterol shows Good control. Long term heart-healthy diet and lifestyle discussed and encouraged to reduce risk of cardiovascular disease.  Cholesterol: 169, done on 12/1/2023.  HDL Cholesterol: 55, done on 12/1/2023.  TriGlycerides 68, done on 12/1/2023.  LDL Cholesterol: 99, done on 12/1/2023.   Cholesterol medications include atorvastatin 20 MG Oral Tab [[id number]], atorvastatin 20 MG Oral Tab [[id number]] (Long-Term Med).

## 2024-07-11 NOTE — ASSESSMENT & PLAN NOTE
BP shows poor control with last BP of 140/70. Lifestyle changes, diet, exercise and weight loss were counseled. Medication changes as listed.   Last K was 4.1 done on 12/1/2023.  Last Cr was 0.93 done on 3/29/2024.  Last eGFR was 89 on 3/29/2024.

## 2024-07-12 ENCOUNTER — OFFICE VISIT (OUTPATIENT)
Dept: FAMILY MEDICINE CLINIC | Facility: CLINIC | Age: 69
End: 2024-07-12
Payer: MEDICARE

## 2024-07-12 VITALS
SYSTOLIC BLOOD PRESSURE: 138 MMHG | HEART RATE: 80 BPM | RESPIRATION RATE: 14 BRPM | HEIGHT: 70 IN | DIASTOLIC BLOOD PRESSURE: 84 MMHG | BODY MASS INDEX: 28.37 KG/M2 | WEIGHT: 198.19 LBS

## 2024-07-12 DIAGNOSIS — E78.2 MIXED HYPERLIPIDEMIA: ICD-10-CM

## 2024-07-12 DIAGNOSIS — I10 ESSENTIAL HYPERTENSION: Primary | ICD-10-CM

## 2024-07-12 DIAGNOSIS — D89.89 AUTOIMMUNE DISORDER (HCC): ICD-10-CM

## 2024-07-12 DIAGNOSIS — R97.20 ELEVATED PSA, LESS THAN 10 NG/ML: ICD-10-CM

## 2024-07-12 PROCEDURE — G2211 COMPLEX E/M VISIT ADD ON: HCPCS | Performed by: FAMILY MEDICINE

## 2024-07-12 PROCEDURE — 99214 OFFICE O/P EST MOD 30 MIN: CPT | Performed by: FAMILY MEDICINE

## 2024-07-12 RX ORDER — SILDENAFIL 50 MG/1
50 TABLET, FILM COATED ORAL
Qty: 30 TABLET | Refills: 3 | Status: SHIPPED | OUTPATIENT
Start: 2024-07-12

## 2024-07-12 NOTE — PROGRESS NOTES
Onel is a 69 year old male coming in for had concerns including Blood Pressure (6 months follow up ) and Lab (Discuss blood work results ).    Subjective:   HPI   Bp usually 130 80 at home, usually up here with white coat syndeom.   PSA 4.2 last year, now 2.4, varies form 1.5 to 4.2 in 10 years    Objective:   /84   Pulse 80   Resp 14   Ht 5' 10\" (1.778 m)   Wt 198 lb 3.2 oz (89.9 kg)   BMI 28.44 kg/m²  Body mass index is 28.44 kg/m².   Physical Exam  Vitals and nursing note reviewed.   Constitutional:       General: He is not in acute distress.     Appearance: Normal appearance. He is well-developed.   HENT:      Head: Normocephalic and atraumatic.   Cardiovascular:      Rate and Rhythm: Normal rate and regular rhythm.      Pulses:           Posterior tibial pulses are 2+ on the right side and 2+ on the left side.      Heart sounds: Normal heart sounds. No murmur heard.  Pulmonary:      Effort: Pulmonary effort is normal. No respiratory distress.      Breath sounds: Normal breath sounds. No wheezing.   Abdominal:      General: Bowel sounds are normal.      Palpations: Abdomen is soft.      Tenderness: There is no abdominal tenderness.   Musculoskeletal:         General: Normal range of motion.      Cervical back: Normal range of motion.      Right lower leg: No edema.      Left lower leg: No edema.   Skin:     General: Skin is warm and dry.      Findings: No rash.   Neurological:      Mental Status: He is alert and oriented to person, place, and time.   Psychiatric:         Mood and Affect: Mood normal.         Behavior: Behavior normal.         Thought Content: Thought content normal.         Judgment: Judgment normal.           Assessment & Plan:   1. Essential hypertension (Primary)  Overview:  No current meds.   Assessment & Plan:  BP shows poor control with last BP of 140/70. Lifestyle changes, diet, exercise and weight loss were counseled. Medication changes as listed.   Last K was 4.1 done on  12/1/2023.  Last Cr was 0.93 done on 3/29/2024.  Last eGFR was 89 on 3/29/2024.   2. Mixed hyperlipidemia  Overview:  Atorvastatin 10  Assessment & Plan:  Cholesterol shows Good control. Long term heart-healthy diet and lifestyle discussed and encouraged to reduce risk of cardiovascular disease.  Cholesterol: 169, done on 12/1/2023.  HDL Cholesterol: 55, done on 12/1/2023.  TriGlycerides 68, done on 12/1/2023.  LDL Cholesterol: 99, done on 12/1/2023.   Cholesterol medications include atorvastatin 20 MG Oral Tab [[id number]], atorvastatin 20 MG Oral Tab [[id number]] (Long-Term Med).     3. Autoimmune disorder (HCC)  Overview:  RA (elevated ESR, +synovitis in wrist on MRI) vs PMR, on methotrexate and folic acid  Assessment & Plan:  Stable, continue present management and continue to monitor for progression per rheumatology  4. Elevated PSA, less than 10 ng/ml  Overview:  PSA 4.22 in 12/2023  Assessment & Plan:  12/1/2023: PSA, TOTAL 4.22 (H) stable, continue present management and continue to monitor for progression elevation. Continue to monitor and continue present management with repeat this fall.  Other orders  -     Sildenafil Citrate; Take 1 tablet (50 mg total) by mouth daily as needed for Erectile Dysfunction.  Dispense: 30 tablet; Refill: 3     I have discontinued Onel Melendez III's PEG 3350-KCl-Na Bicarb-NaCl. I am also having him start on Sildenafil Citrate. Additionally, I am having him maintain his methotrexate, Omega-3 Fatty Acids (FISH OIL OR), Turmeric (QC TUMERIC COMPLEX OR), Coenzyme Q10 (CO Q 10 OR), Multi-Vitamin/Minerals, RESVERATROL OR, atorvastatin, and folic acid. We will continue to administer methylPREDNISolone acetate.       Return in about 5 months (around 12/12/2024) for AWV with chonic condition follow up.

## 2024-09-11 RX ORDER — SILDENAFIL 50 MG/1
50 TABLET, FILM COATED ORAL
Qty: 30 TABLET | Refills: 3 | Status: SHIPPED | OUTPATIENT
Start: 2024-09-11

## 2024-09-11 NOTE — TELEPHONE ENCOUNTER
Requested Prescriptions     Pending Prescriptions Disp Refills    Sildenafil Citrate 50 MG Oral Tab 30 tablet 3     Sig: Take 1 tablet (50 mg total) by mouth daily as needed for Erectile Dysfunction.     LOV 7/12/2024     Patient was asked to follow-up in: 5 months     Appointment scheduled: Visit date not found     Medication refilled per protocol.

## 2024-09-13 DIAGNOSIS — E78.2 MIXED HYPERLIPIDEMIA: ICD-10-CM

## 2024-09-23 RX ORDER — ATORVASTATIN CALCIUM 20 MG/1
20 TABLET, FILM COATED ORAL DAILY
Qty: 90 TABLET | Refills: 1 | Status: SHIPPED | OUTPATIENT
Start: 2024-09-23

## 2024-09-23 NOTE — TELEPHONE ENCOUNTER
Requested Prescriptions     Pending Prescriptions Disp Refills    ATORVASTATIN 20 MG Oral Tab [Pharmacy Med Name: ATORVASTATIN 20MG TABLETS] 90 tablet 1     Sig: TAKE 1 TABLET(20 MG) BY MOUTH DAILY     LOV 7/12/2024     Patient was asked to follow-up in: 5 months    Appointment scheduled: Visit date not found     Medication refilled per protocol.

## 2024-10-25 ENCOUNTER — TELEPHONE (OUTPATIENT)
Dept: FAMILY MEDICINE CLINIC | Facility: CLINIC | Age: 69
End: 2024-10-25

## 2024-10-25 NOTE — TELEPHONE ENCOUNTER
Monday started to have symptoms chills and fever felt weak and it peaked Tuesday and is better now no fever slight cough still feels fatigued. I sent the covid inforamtion to him to review but he is mostly over the covid and is able to RTW.

## 2024-11-29 RX ORDER — METHOTREXATE 2.5 MG/1
15 TABLET ORAL
Qty: 74 TABLET | Refills: 0 | Status: SHIPPED | OUTPATIENT
Start: 2024-11-29

## 2024-11-29 NOTE — TELEPHONE ENCOUNTER
LOV: 10/19/23  Last refill: 09/29/23  RTC: 6-9 months  Component      Latest Ref Rng 3/29/2024   ALT (SGPT)      9 - 46 U/L 26    AST (SGOT)      10 - 35 U/L 26            Component      Latest Ref Rng 3/29/2024   WBC      3.8 - 10.8 Thousand/uL 5.8    RBC      4.20 - 5.80 Million/uL 4.63    Hemoglobin      13.2 - 17.1 g/dL 14.3    Hematocrit      38.5 - 50.0 % 42.7    MCV      80.0 - 100.0 fL 92.2    MCH      27.0 - 33.0 pg 30.9    MCHC      32.0 - 36.0 g/dL 33.5    RDW      11.0 - 15.0 % 12.6    Platelet Count      140 - 400 Thousand/uL 324    MPV      7.5 - 12.5 fL 10.4    Neutrophils Absolute      1,500 - 7,800 cells/uL 2,825    Lymphocytes Absolute      850 - 3,900 cells/uL 1,926    Monocytes Absolute      200 - 950 cells/uL 557    Eosinophils Absolute      15 - 500 cells/uL 423    Basophils Absolute      0 - 200 cells/uL 70    Neutrophils %      % 48.7    Lymphocytes %      % 33.2    Monocytes %      % 9.6    Eosinophils %      % 7.3    Basophils %      % 1.2          Component      Latest Ref Rng 3/29/2024   CREATININE      0.70 - 1.35 mg/dL 0.93    EGFR      > OR = 60 mL/min/1.73m2 89

## 2025-01-13 ENCOUNTER — TELEPHONE (OUTPATIENT)
Dept: FAMILY MEDICINE CLINIC | Facility: CLINIC | Age: 70
End: 2025-01-13

## 2025-01-13 DIAGNOSIS — Z13.0 SCREENING FOR IRON DEFICIENCY ANEMIA: ICD-10-CM

## 2025-01-13 DIAGNOSIS — E78.5 DYSLIPIDEMIA: ICD-10-CM

## 2025-01-13 DIAGNOSIS — Z79.899 LONG-TERM USE OF HIGH-RISK MEDICATION: ICD-10-CM

## 2025-01-13 DIAGNOSIS — R97.20 ELEVATED PSA, LESS THAN 10 NG/ML: Primary | ICD-10-CM

## 2025-01-13 DIAGNOSIS — Z13.6 SCREENING FOR CARDIOVASCULAR CONDITION: ICD-10-CM

## 2025-01-13 DIAGNOSIS — Z12.5 SCREENING FOR PROSTATE CANCER: ICD-10-CM

## 2025-01-13 DIAGNOSIS — Z13.29 SCREENING FOR THYROID DISORDER: ICD-10-CM

## 2025-01-13 DIAGNOSIS — Z13.1 SCREENING FOR DIABETES MELLITUS: ICD-10-CM

## 2025-01-13 DIAGNOSIS — I10 ESSENTIAL HYPERTENSION: ICD-10-CM

## 2025-01-13 NOTE — TELEPHONE ENCOUNTER
1. Elevated PSA, less than 10 ng/ml (Primary)  Overview:  PSA 4.22 in 12/2023  2. Screening for diabetes mellitus  -     Comp Metabolic Panel (14)  3. Screening for iron deficiency anemia  -     CBC With Differential With Platelet  4. Screening for thyroid disorder  -     TSH W Reflex To Free T4  5. Screening for cardiovascular condition  -     Lipid Panel  6. Long-term use of high-risk medication  -     CBC With Differential With Platelet  7. Screening for prostate cancer  -     PSA Total, Screen; Future; Expected date: 01/13/2025  8. Dyslipidemia  -     TSH W Reflex To Free T4  -     Lipid Panel  -     Comp Metabolic Panel (14)  9. Essential hypertension  Overview:  No current meds.   Orders:  -     TSH W Reflex To Free T4  -     Comp Metabolic Panel (14)  -     CBC With Differential With Platelet       OK to notify. Thanks, Remy Marie MD

## 2025-01-13 NOTE — TELEPHONE ENCOUNTER
Please enter lab orders for the patient's upcoming physical appointment.     Physical scheduled:   Your appointments       Date & Time Appointment Department ([place])    Feb 05, 2025 4:30 PM CST Medicare Annual Well Visit with Dhaval Marie MD [facility] ([facility])              [facility]  [address]  [facility] [address]  [phone]           Preferred lab: [facility]     The patient has been notified to complete fasting labs prior to their physical appointment.

## 2025-02-24 RX ORDER — METHOTREXATE 2.5 MG/1
15 TABLET ORAL
Qty: 74 TABLET | Refills: 0 | Status: SHIPPED | OUTPATIENT
Start: 2025-02-24

## 2025-02-24 NOTE — TELEPHONE ENCOUNTER
Last blood work was April 2024.  He is to get blood work as soon as possible to monitor his liver and kidney tests on methotrexate

## 2025-02-24 NOTE — TELEPHONE ENCOUNTER
LOV: 10/19/23  Future Appointments   Date Time Provider Department Center   3/19/2025 11:40 AM Marie Garcia MD [facility]   3/31/2025  5:00 PM Dhaval Marie MD [facility] [place]   4/1/2025  4:45 PM Santos Agudelo MD [facility]     Labs: AST 26 ALT 26 4/1/24  ASSESSMENT/PLAN:         B/l shoulder, hip and R wrist pain likely Seronegative RA, possible PMR (+MRI wrist with synovitis, elevated ESR)- improved   - MRI of the right wrist consistent with effusion and synovitis  - Blood work showed negative RF, CCP and RP.  ESR was slightly elevated  - symptoms appear to be more consistent with seronegative RA.  He responded very well to prednisone   - cont MTX 6 pills weekly and FA daily  - blood work reviewed  - blood work ever 4 mos      R 4th Tenosynovitis  - R 4th and sheath injected back in September 2020, July 2021, 9/2022  - Right fourth finger continues to trigger.  - Right fourth A1 pulley region injected with 0.25 cc of lidocaine and 20 mg of Depo-Medrol in sterile fashion.  Patient tolerated pressure well  R shoulder RTC tear, tendonitis and synovitis- resolved  - R shoulder injected with cortisone in jan and completed PT  - Reports significant improvement in pain in his right shoulder.  Elevated eosinophils  - He has some allergies, also some hives.  We will continue to monitor     Pt will f/u in 6-9 mos     Marie Garcia MD  10/19/2023  11:40 AM

## 2025-03-17 LAB
ABSOLUTE BASOPHILS: 80 CELLS/UL (ref 0–200)
ABSOLUTE EOSINOPHILS: 690 CELLS/UL (ref 15–500)
ABSOLUTE LYMPHOCYTES: 2086 CELLS/UL (ref 850–3900)
ABSOLUTE MONOCYTES: 530 CELLS/UL (ref 200–950)
ABSOLUTE NEUTROPHILS: 2314 CELLS/UL (ref 1500–7800)
ALBUMIN: 4.1 G/DL (ref 3.6–5.1)
ALT: 27 U/L (ref 9–46)
AST: 24 U/L (ref 10–35)
BASOPHILS: 1.4 %
C-REACTIVE PROTEIN: <3 MG/L
CREATININE: 0.99 MG/DL (ref 0.7–1.35)
EGFR: 82 ML/MIN/1.73M2
EOSINOPHILS: 12.1 %
HEMATOCRIT: 44.4 % (ref 38.5–50)
HEMOGLOBIN: 14.8 G/DL (ref 13.2–17.1)
LYMPHOCYTES: 36.6 %
MCH: 31.3 PG (ref 27–33)
MCHC: 33.3 G/DL (ref 32–36)
MCV: 93.9 FL (ref 80–100)
MONOCYTES: 9.3 %
MPV: 9.9 FL (ref 7.5–12.5)
NEUTROPHILS: 40.6 %
PLATELET COUNT: 325 THOUSAND/UL (ref 140–400)
RDW: 12.7 % (ref 11–15)
RED BLOOD CELL COUNT: 4.73 MILLION/UL (ref 4.2–5.8)
SED RATE BY MODIFIED$WESTERGREN: 6 MM/H
WHITE BLOOD CELL COUNT: 5.7 THOUSAND/UL (ref 3.8–10.8)

## 2025-03-19 ENCOUNTER — OFFICE VISIT (OUTPATIENT)
Age: 70
End: 2025-03-19
Payer: MEDICARE

## 2025-03-19 VITALS
HEIGHT: 71 IN | HEART RATE: 60 BPM | WEIGHT: 209 LBS | SYSTOLIC BLOOD PRESSURE: 146 MMHG | DIASTOLIC BLOOD PRESSURE: 74 MMHG | BODY MASS INDEX: 29.26 KG/M2

## 2025-03-19 DIAGNOSIS — M65.341 TRIGGER RING FINGER OF RIGHT HAND: ICD-10-CM

## 2025-03-19 DIAGNOSIS — M19.90 INFLAMMATORY ARTHRITIS: Primary | ICD-10-CM

## 2025-03-19 DIAGNOSIS — Z51.81 THERAPEUTIC DRUG MONITORING: ICD-10-CM

## 2025-03-19 PROCEDURE — 20550 NJX 1 TENDON SHEATH/LIGAMENT: CPT | Performed by: INTERNAL MEDICINE

## 2025-03-19 PROCEDURE — 99214 OFFICE O/P EST MOD 30 MIN: CPT | Performed by: INTERNAL MEDICINE

## 2025-03-19 RX ORDER — FOLIC ACID 1 MG/1
1 TABLET ORAL DAILY
Qty: 90 TABLET | Refills: 3 | Status: SHIPPED | OUTPATIENT
Start: 2025-03-19

## 2025-03-19 NOTE — PATIENT INSTRUCTIONS
You were seen today for rheumatoid arthritis  Overall joints are doing really well  You are having some pain in the right fourth finger, we injected it with cortisone  Blood work overall looked good  Continue get blood work at least every 4 months  You can see me in 6 to 9 months

## 2025-03-19 NOTE — PROGRESS NOTES
William Lazarus Solomon III is a 69 year old male.    HPI:     Chief Complaint   Patient presents with    Follow - Up     Inflammatory arthritis     Presents for f/u on 3/19/2025 of Seronegative RA (elevated ESR, +synovitis in wrist on MRI) vs PMR    Current Medications:  MTX 6 pills weekly, FA daily- started sept 2019  Blood work:  Neg HARDY, RF, CCP, CRP, HLA-B27  ESR 34--> normal  MRI R wrist showing nonspecific radiocarpal joint effusion/synovitis, trace 2nd and 4th tensor compartment tenosynovitis.    Interval History:  This is a 63 yo M with hx of Basal cell carcinoma presents for f/u for polyarthralgia's.  He started to experience R wrist pain with intermittent swelling back in January 2019.  It would be hard for him to do a push-up due to the wrist pain.  Now reports restricted extension and flexion secondary to the pain.  He then developed bilateral shoulder and hip girdle pain starting in May/June 2019.  He states that he has full range of motion but would feel stiffness when raising his arms.  R worse than L.  He also reported bilateral hip girdle pain and stiffness.  At times will be hard for him to get in and out of the car due to the pain.  Denies any trauma or fall.  He wouldlcontinue to work out and do the elliptical daily for about 30 minutes.  He states that his joints felt better after working out, states that \"they felt warmed up.\"  Denies any lower back pain.  On examination he has limited range of motion his right wrist.  MRI also showed evidence of effusion and synovitis in his right wrist.    7/15/2021:  Presentsfor follow-up of Seronegative RA (elevated ESR, +synovitis in wrist on MRI) vs PMR mostly in his bilateral shoulders and right wrist.   Normal CBC, LFTs, ESR and CRP  He currently is on methotrexate 6 pills weekly and folic acid daily. Doing well, no wrist pain. No shoulder pain  His last visit in September he had right fourth trigger finger.  We injected it and it helped.  His symptoms  came back a couple of months ago.  He now even has some triggering in his R third finger.    11/10/2021:  Presentsfor follow-up of Seronegative RA (elevated ESR, +synovitis in wrist on MRI) mostly in his bilateral shoulders and right wrist.  Last visit we injected his right fourth trigger finger with cortisone  R 4th finger still triggers but not as much, able to play guitar  Cant do push ups  R shoulder doing well  No swelling or stiffness     9/1/2022:  Presentsfor follow-up of Seronegative RA (elevated ESR, +synovitis in wrist on MRI) mostly in his bilateral shoulders and right wrist.  On methotrexate 6 pills weekly  Joints doing well, no pain, no swelling  R 4th finger is getting stuck  Playing the guitar more since being retired     10/19/2023:  Presentsfor follow-up of Seronegative RA (elevated ESR, +synovitis in wrist on MRI) mostly in his bilateral shoulders and right wrist.  On methotrexate 6 pills weekly  Joints doing well, no pain, no swelling  Right 4th finger was injected with cortisone and did help but coming back, still having some triggering but not as bad  Playing the guitar more since being retired   Recent blood work looked good    3/19/2025:  Presentsfor follow-up of Seronegative RA (elevated ESR, +synovitis in wrist on MRI) mostly in his bilateral shoulders and right wrist.  On methotrexate 6 pills weekly  He is back to work, director of operation at a [employer]  Had surgery on left eye Pterygium surgery, will be having surgery in the right eye in April  No joint pain or swelling  Having pain in right 4th finger, +triggering   Eosinophils have fluctuated, denies any allergies or Asthma            HISTORY:  Past Medical History:    Abdominal hernia    Arthritis    Rheumatoid Arthritis being successfully treated    Cancer (HCC)    basal cell on face and scalp    Family history of malignant hyperthermia    BROTHER- in the 90s after jaw sx    Flatulence/gas pain/belching    Hearing impaired person,  bilateral    no hearing aids    Hearing loss    Gradual with age; not treated    High blood pressure    not taking meds but controlled    High cholesterol    Malignant hyperthermia    Family history of MH    Pain in joints    Due to RA    RA (rheumatoid arthritis) (HCC)    Visual impairment    GLASSES      Social Hx Reviewed   Family Hx Reviewed     Medications (Active prior to today's visit):  Current Outpatient Medications   Medication Sig Dispense Refill    METHOTREXATE 2.5 MG Oral Tab Take 6 tablets (15 mg total) by mouth every 7 days. 74 tablet 0    ATORVASTATIN 20 MG Oral Tab TAKE 1 TABLET(20 MG) BY MOUTH DAILY 90 tablet 1    Sildenafil Citrate 50 MG Oral Tab Take 1 tablet (50 mg total) by mouth daily as needed for Erectile Dysfunction. 30 tablet 3    Sildenafil Citrate 50 MG Oral Tab Take 1 tablet (50 mg total) by mouth daily as needed for Erectile Dysfunction. 30 tablet 3    folic acid 1 MG Oral Tab Take 1 tablet (1 mg total) by mouth daily. 90 tablet 3    Omega-3 Fatty Acids (FISH OIL OR) Take by mouth.      Turmeric (QC TUMERIC COMPLEX OR) Take by mouth.      Coenzyme Q10 (CO Q 10 OR) Take by mouth.      Multiple Vitamins-Minerals (MULTI-VITAMIN/MINERALS) Oral Tab Take 1 tablet by mouth daily.      RESVERATROL OR Take by mouth.       .cmed  Allergies:  No Known Allergies      ROS:   All other ROS are negative.     PHYSICAL EXAM:   GEN: AAOx3, NAD  HEENT: EOMI, PERRLA, no injection or icterus, oral mucosa moist, no oral lesions. No lymphadenopathy. No facial rash  CVS: RRR, no murmurs rubs or gallops. Equal 2+ distal pulses.   LUNGS: CTAB, no increased work of breathing  ABDOMEN:  soft NT/ND, +BS, no HSM  SKIN: No rashes or skin lesions. No nail findings  MSK:  Cervical spine: FROM  Hands: R 4th trigger finger  Wrist: mild chronic synovitis in R wrist  Elbow: FROM, no pain or swelling or warmth on palpation  Shoulders: FROM, no pain or swelling or warmth on palpation  Hip: normal log roll, no lateral hip  pain, ANSHU test negative b/l  Knees: FROM, no warmth or effusion present. No pain with ROM.   Ankles: FROM, no pain or swelling or warmth on palpation  Feet: no pain with MTP squeeze, no toe swelling or pain or warmth on palpation with FROM  Spine: no lumbar or sacral pain on palpation.  NEURO: Cranial nerves II-XII intact grossly. 5/5 strength throughout in both upper and lower extremities, sensation intact.  PSYCH: normal mood         LABS:     Component      Latest Ref Rng & Units 2/13/2020   WHITE BLOOD CELL COUNT      3.8 - 10.8 Thousand/uL 9.3   RED BLOOD CELL COUNT      4.20 - 5.80 Million/uL 4.64   Hemoglobin      13.2 - 17.1 g/dL 14.5   Hematocrit      38.5 - 50.0 % 40.9   MCV      80.0 - 100.0 fL 88.1   MCH      27.0 - 33.0 pg 31.3   MCHC      32.0 - 36.0 g/dL 35.5   RDW      11.0 - 15.0 % 13.1   Platelet Count      140 - 400 Thousand/uL 324   MPV      7.5 - 12.5 fL 10.7   NEUTROPHILS ABSOLUTE      1,500 - 7,800 cells/uL 6,380   ABSOLUTE LYMPHOCYTES      850 - 3,900 cells/uL 1,535   ABSOLUTE MONOCYTES      200 - 950 cells/uL 874   ABSOLUTE EOSINOPHILS      15 - 500 cells/uL 419   ABSOLUTE BASOPHILS      0 - 200 cells/uL 93   NEUTROPHILS      % 68.6   LYMPHOCYTES      % 16.5   MONOCYTES      % 9.4   EOSINOPHILS      % 4.5   BASOPHILS      % 1.0   CREATININE      0.70 - 1.25 mg/dL 1.02   eGFR NON-AFR. AMERICAN      > OR = 60 mL/min/1.73m2 77   eGFR AFRICAN AMERICAN      > OR = 60 mL/min/1.73m2 90   Albumin      3.6 - 5.1 g/dL 4.5   ALT (SGPT)      9 - 46 U/L 31   AST      10 - 35 U/L 22   C-REACTIVE PROTEIN      <8.0 mg/L 1.3   SED RATE BY MODIFIED$WESTERGREN      < OR = 20 mm/h 6     Component      Latest Ref Rng & Units 9/16/2019   Quantiferon TB NIL      IU/mL 0.04   Quantiferon-TB1 Minus NIL      IU/mL 0.01   Quantiferon-TB2 Minus NIL      IU/mL 0.00   Quantiferon TB Mitogen minus NIL      IU/mL >10.00   QTB.RESULT      Negative Negative   HBSAg Screen      Nonreactive  Nonreactive   Hbsag Screen  Index       <0.10   HEPATITIS B SURFACE AB QUAL      Nonreactive  Nonreactive   HEPATITIS B SURFACE AB QUANT      mIU/mL <3.10   HCV AB      Nonreactive  Nonreactive   HEPATITIS B CORE AB, TOTAL      Nonreactive  Nonreactive   GLUCOSE-6-PHOSPHATE DEHYDROGEN      9.9 - 16.6 U/g Hb 12.3   HLA-B27      Negative Negative     Component      Latest Ref Rng & Units 9/16/2019 8/28/2019   HARDY SCREEN, IFA      NEGATIVE  NEGATIVE   RHEUMATOID FACTOR      <14 IU/mL  <14   C-REACTIVE PROTEIN      <8.0 mg/L  7.4   SED RATE BY MODIFIED$WESTERGREN      < OR = 20 mm/h  34 (H)   C-Citrullinated Peptide IgG AB      0.0 - 6.9 U/mL 1.6    URIC ACID      3.5 - 7.2 mg/dL 5.6      Imaging:     MRI R shoulder 12/2019:  Partial-thickness articular surface tears of the supraspinatus and infraspinatus with subacromial subdeltoid bursitis.  Calcific tendinosis of the posterior mid footplate of the supraspinatus tendon  Mild intra-articular long head biceps tendinosis with biceps tenosynovitis.  Diffuse glenohumeral and capsular synovitis with extensive edema and enhancement involving the axillary recess, rotator interval, and inferior glenohumeral ligament.  This is a nonspecific finding but can be seen with adhesive capsulitis.  Nonspecific   inflammatory arthritis is also within the differential and is not excluded.    MRI R wrist 9/4/19:  - Nonspecific radiocarpal joint effusion/synovitis and mild diffuse pericapsular edema.  Distal radioulnar joint effusion/synovitis.  - Trace second and fourth extensor compartment tenosynovitis.  No discrete dorsal ganglion cyst.  - Degenerative changes involving the base of the thumb first CMC joint, triscaphe joint, and pisotriquetral joints.  - Small perforation within the central TFC disc.     XR wrist 8/2019:  X-rays show decreased joint space in the right thumb CMC joint.  There is   also decreased joint space in   the thumb MCP joint, IP joint.    ASSESSMENT/PLAN:        B/l shoulder, hip and R  wrist pain likely Seronegative RA, possible PMR (+MRI wrist with synovitis, elevated ESR)- improved   - MRI of the right wrist consistent with effusion and synovitis  - Blood work showed negative RF, CCP and RP.  ESR was slightly elevated  - symptoms appear to be more consistent with seronegative RA.  He responded very well to prednisone   - cont MTX 6 pills weekly and FA daily  - blood work reviewed  - blood work ever 4 mos     R 4th Tenosynovitis  - R 4th and sheath injected back in September 2020, July 2021, 9/2022, 10/2023  - Right fourth finger continues to trigger.  - Right fourth A1 pulley region injected with 0.25 cc of lidocaine and 20 mg of Depo-Medrol in sterile fashion.  Patient tolerated pressure well  R shoulder RTC tear, tendonitis and synovitis- resolved  - R shoulder injected with cortisone in jan and completed PT  - Reports significant improvement in pain in his right shoulder.  Elevated eosinophils  - He has some allergies, also some hives.  We will continue to monitor    Pt will f/u in 6-9 mos    There is a longitudinal care relationship with me, the care plan reflects the ongoing nature of the continuous relationship of care, and the medical record indicates that there is ongoing treatment of a serious/complex medical condition which I am currently managing.  is Applicable.     Marie Garcia MD  3/19/2025  11:40 AM

## 2025-03-20 RX ORDER — METHYLPREDNISOLONE ACETATE 80 MG/ML
20 INJECTION, SUSPENSION INTRA-ARTICULAR; INTRALESIONAL; INTRAMUSCULAR; SOFT TISSUE ONCE
Status: COMPLETED | OUTPATIENT
Start: 2025-03-20 | End: 2025-03-19

## 2025-03-20 NOTE — PROCEDURES
With  consent, I injected the patient's Right fourth tendon sheath with 0.25ml lidocaine  1% and 0.25ml depo 80. It was under sterile technique using iodine and alcohol swabs and ethyl chloride was used as an anaesthetic spray. Pt.  tolerated it well.

## 2025-03-23 PROBLEM — M19.90 INFLAMMATORY ARTHRITIS: Status: ACTIVE | Noted: 2025-03-23

## 2025-03-28 LAB
ABSOLUTE BASOPHILS: 87 CELLS/UL (ref 0–200)
ABSOLUTE EOSINOPHILS: 597 CELLS/UL (ref 15–500)
ABSOLUTE LYMPHOCYTES: 2129 CELLS/UL (ref 850–3900)
ABSOLUTE MONOCYTES: 557 CELLS/UL (ref 200–950)
ABSOLUTE NEUTROPHILS: 2430 CELLS/UL (ref 1500–7800)
ALBUMIN/GLOBULIN RATIO: 1.5 (CALC) (ref 1–2.5)
ALBUMIN: 4.2 G/DL (ref 3.6–5.1)
ALKALINE PHOSPHATASE: 66 U/L (ref 35–144)
ALT: 25 U/L (ref 9–46)
AST: 23 U/L (ref 10–35)
BASOPHILS: 1.5 %
BILIRUBIN, TOTAL: 0.8 MG/DL (ref 0.2–1.2)
BUN: 14 MG/DL (ref 7–25)
CALCIUM: 9.5 MG/DL (ref 8.6–10.3)
CARBON DIOXIDE: 31 MMOL/L (ref 20–32)
CHLORIDE: 103 MMOL/L (ref 98–110)
CHOL/HDLC RATIO: 3.1 (CALC)
CHOLESTEROL, TOTAL: 146 MG/DL
CREATININE: 0.98 MG/DL (ref 0.7–1.35)
EGFR: 83 ML/MIN/1.73M2
EOSINOPHILS: 10.3 %
GLOBULIN: 2.8 G/DL (CALC) (ref 1.9–3.7)
GLUCOSE: 99 MG/DL (ref 65–99)
HDL CHOLESTEROL: 47 MG/DL
HEMATOCRIT: 45.2 % (ref 38.5–50)
HEMOGLOBIN: 14.7 G/DL (ref 13.2–17.1)
LDL-CHOLESTEROL: 84 MG/DL (CALC)
LYMPHOCYTES: 36.7 %
MCH: 30.6 PG (ref 27–33)
MCHC: 32.5 G/DL (ref 32–36)
MCV: 94.2 FL (ref 80–100)
MONOCYTES: 9.6 %
MPV: 10.4 FL (ref 7.5–12.5)
NEUTROPHILS: 41.9 %
NON-HDL CHOLESTEROL: 99 MG/DL (CALC)
PLATELET COUNT: 362 THOUSAND/UL (ref 140–400)
POTASSIUM: 4.4 MMOL/L (ref 3.5–5.3)
PROTEIN, TOTAL: 7 G/DL (ref 6.1–8.1)
RDW: 12.6 % (ref 11–15)
RED BLOOD CELL COUNT: 4.8 MILLION/UL (ref 4.2–5.8)
SODIUM: 138 MMOL/L (ref 135–146)
TRIGLYCERIDES: 72 MG/DL
TSH W/REFLEX TO FT4: 2.79 MIU/L (ref 0.4–4.5)
WHITE BLOOD CELL COUNT: 5.8 THOUSAND/UL (ref 3.8–10.8)

## 2025-03-30 NOTE — ASSESSMENT & PLAN NOTE
Cholesterol shows Good control. Long term heart-healthy diet and lifestyle discussed and encouraged to reduce risk of cardiovascular disease.  3/27/2025: CHOLESTEROL, TOTAL 146; HDL CHOLESTEROL 47; TRIGLYCERIDES 72; LDL-CHOLESTEROL 84  Cholesterol Meds: atorvastatin Tabs - 20 MG  stable  Continue with current treatment plan

## 2025-03-30 NOTE — ASSESSMENT & PLAN NOTE
BP shows poor control with last BP of 146/74. Lifestyle changes, diet, exercise and weight loss were counseled. Medication changes as listed.   3/27/2025: POTASSIUM 4.4; CREATININE 0.98; EGFR 83

## 2025-03-30 NOTE — ASSESSMENT & PLAN NOTE
Stable continue to monitor and continue present management   RA (elevated ESR, +synovitis in wrist on MRI) vs PMR, on methotrexate and folic acid

## 2025-03-30 NOTE — ASSESSMENT & PLAN NOTE
Stable, continue present management and continue to monitor for progression per rheumatology  RA (elevated ESR, +synovitis in wrist on MRI) vs PMR, on methotrexate and folic acid   no

## 2025-03-31 ENCOUNTER — OFFICE VISIT (OUTPATIENT)
Dept: FAMILY MEDICINE CLINIC | Facility: CLINIC | Age: 70
End: 2025-03-31
Payer: MEDICARE

## 2025-03-31 VITALS
OXYGEN SATURATION: 98 % | HEART RATE: 68 BPM | DIASTOLIC BLOOD PRESSURE: 82 MMHG | WEIGHT: 207.81 LBS | RESPIRATION RATE: 14 BRPM | SYSTOLIC BLOOD PRESSURE: 130 MMHG | HEIGHT: 71 IN | BODY MASS INDEX: 29.09 KG/M2

## 2025-03-31 DIAGNOSIS — Z00.00 ANNUAL PHYSICAL EXAM: Primary | ICD-10-CM

## 2025-03-31 DIAGNOSIS — M19.90 INFLAMMATORY ARTHRITIS: ICD-10-CM

## 2025-03-31 DIAGNOSIS — E78.2 MIXED HYPERLIPIDEMIA: ICD-10-CM

## 2025-03-31 DIAGNOSIS — R97.20 ELEVATED PSA, LESS THAN 10 NG/ML: ICD-10-CM

## 2025-03-31 DIAGNOSIS — D89.89 AUTOIMMUNE DISORDER (HCC): ICD-10-CM

## 2025-03-31 DIAGNOSIS — I10 ESSENTIAL HYPERTENSION: ICD-10-CM

## 2025-03-31 RX ORDER — ATORVASTATIN CALCIUM 20 MG/1
20 TABLET, FILM COATED ORAL DAILY
Qty: 90 TABLET | Refills: 3 | Status: SHIPPED | OUTPATIENT
Start: 2025-03-31

## 2025-03-31 RX ORDER — NEOMYCIN SULFATE, POLYMYXIN B SULFATE, AND DEXAMETHASONE 3.5; 10000; 1 MG/G; [USP'U]/G; MG/G
OINTMENT OPHTHALMIC
COMMUNITY
Start: 2025-03-03

## 2025-03-31 NOTE — PROGRESS NOTES
Subjective:   William Lazarus Solomon III is a 69 year old male who presents for a Subsequent Annual Wellness visit (Pt already had Initial Annual Wellness) and scheduled follow up of multiple significant but stable problems.   History of Present Illness  Bill Lazarus Solomon III is a 69 year old male who presents for a routine follow-up and medication review.    He is currently taking atorvastatin and methotrexate. He had a six-month supply of methotrexate filled in September and is due for a refill in April. He also takes sildenafil, which was last filled in September with a 15-month supply.    A blood test on 2025, showed a glucose level of 99, with normal liver, kidney, and thyroid function tests. His cholesterol levels have decreased from previous measurements, with a total cholesterol of 146, HDL of 47, and LDL of 84.    He received COVID vaccinations in the fall and had a COVID infection approximately six months ago, which he describes as 'not that bad'.    He mentions gaining weight over the holidays and has a goal to reduce his weight to 185 pounds. He has lost five pounds in the last month. He has stopped drinking soda and now drinks soda water.    He had a pterygium removed from one eye and plans to have the other eye treated soon. He also had a root canal recently, which disrupted his routine.    No polyps were found during his last colonoscopy. He has a family history of colon cancer, with his grandmother having  from it.  Doing well overall.   History/Other:   Fall Risk Assessment:   He has been screened for Falls and is low risk.      Cognitive Assessment:   He had a completely normal cognitive assessment - see flowsheet entries     Functional Ability/Status:   William Lazarus Solomon III has some abnormal functions as listed below:  He has Hearing problems based on screening of functional status.      Depression Screening (PHQ):  PHQ-2 SCORE: 0  , done 3/31/2025   If you checked off any  problems, how difficult have these problems made it for you to do your work, take care of things at home, or get along with other people?: Not difficult at all         Advanced Directives:   He does have a Living Will but we do NOT have it on file in Epic.    He has a Power of  for Health Care on file in [facility].  Discussed Advance Care Planning with patient (and family/surrogate if present). Standard forms made available to patient in After Visit Summary.      Patient Active Problem List   Diagnosis    Mixed hyperlipidemia    Essential hypertension    Insomnia    Autoimmune disorder (HCC)    Elevated PSA, less than 10 ng/ml    Inflammatory arthritis     Allergies:  He has No Known Allergies.    Current Medications:  Outpatient Medications Marked as Taking for the 3/31/25 encounter (Office Visit) with Dhaval Marie MD   Medication Sig    neomycin-polymyxin-dexamethasone [id number] Ophthalmic Ointment APPLY 1 APPLICATION INTO THE LOWER EYELID OF AFFECTED EYE TWICE A DAY FOR 14 DAYS    atorvastatin 20 MG Oral Tab Take 1 tablet (20 mg total) by mouth daily.    folic acid 1 MG Oral Tab Take 1 tablet (1 mg total) by mouth daily.    METHOTREXATE 2.5 MG Oral Tab Take 6 tablets (15 mg total) by mouth every 7 days.    Sildenafil Citrate 50 MG Oral Tab Take 1 tablet (50 mg total) by mouth daily as needed for Erectile Dysfunction.    Sildenafil Citrate 50 MG Oral Tab Take 1 tablet (50 mg total) by mouth daily as needed for Erectile Dysfunction.    Omega-3 Fatty Acids (FISH OIL OR) Take by mouth.    Turmeric (QC TUMERIC COMPLEX OR) Take by mouth.    Coenzyme Q10 (CO Q 10 OR) Take by mouth.    Multiple Vitamins-Minerals (MULTI-VITAMIN/MINERALS) Oral Tab Take 1 tablet by mouth daily.    RESVERATROL OR Take by mouth.     Current Facility-Administered Medications for the 3/31/25 encounter (Office Visit) with hDaval Marie MD   Medication    methylPREDNISolone acetate (DEPO-medrol) 80 MG/ML injection 20 mg       Medical  History:  He  has a past medical history of Abdominal hernia, Arthritis (Spring 2019), Cancer (HCC), Family history of malignant hyperthermia, Flatulence/gas pain/belching, Hearing impaired person, bilateral, Hearing loss (Long time ago), High blood pressure, High cholesterol, Malignant hyperthermia, Pain in joints (Spring 2019), RA (rheumatoid arthritis) ([facility]), and Visual impairment.  Surgical History:  He  has a past surgical history that includes colonoscopy (12/8/09); hernia surgery (1994); tonsillectomy; other surgical history (2018); other surgical history (1995); colonoscopy (N/A, 12/29/2020); and colonoscopy (N/A, 5/8/2024).   Family History:  His family history includes Breast Cancer in his maternal aunt and sister; Cancer in his maternal grandmother and mother; Colon Cancer in his maternal grandmother; Colon Polyps in his mother; Family Hx Phenylketonuria in an other family member; Glioblastoma Multiforme (Grade IV) of the brain in his mother; Vasculitides in his father.  Social History:  He  reports that he has never smoked. He has never used smokeless tobacco. He reports current alcohol use. He reports that he does not use drugs.    Tobacco:  He has never smoked tobacco.    CAGE Alcohol Screen:   CAGE screening score of 0 on 3/31/2025, showing low risk of alcohol abuse.      Patient Care Team:  Dhaval Marie MD as PCP - General (Family Practice)  Marie Garcia MD (RHEUMATOLOGY)    Review of Systems   Constitutional: Negative.  Negative for activity change, appetite change, chills and fever.   HENT: Negative.     Eyes: Negative.    Respiratory: Negative.  Negative for shortness of breath.    Cardiovascular: Negative.  Negative for chest pain and palpitations.   Gastrointestinal: Negative.  Negative for abdominal pain.   Genitourinary: Negative.  Negative for dysuria.   Musculoskeletal:  Negative for arthralgias.   Skin: Negative.  Negative for rash.   Allergic/Immunologic: Negative.    Neurological:  Negative.        Objective:   Physical Exam  Vitals and nursing note reviewed.   Constitutional:       General: He is not in acute distress.     Appearance: Normal appearance.   HENT:      Head: Normocephalic and atraumatic.      Right Ear: Tympanic membrane and external ear normal.      Left Ear: Tympanic membrane and external ear normal.      Nose: Nose normal.      Mouth/Throat:      Mouth: Mucous membranes are moist.   Eyes:      Extraocular Movements: Extraocular movements intact.      Pupils: Pupils are equal, round, and reactive to light.   Cardiovascular:      Rate and Rhythm: Normal rate and regular rhythm.      Pulses: Normal pulses.           Carotid pulses are 2+ on the right side and 2+ on the left side.       Radial pulses are 2+ on the right side and 2+ on the left side.        Dorsalis pedis pulses are 2+ on the right side and 2+ on the left side.        Posterior tibial pulses are 2+ on the right side and 2+ on the left side.      Heart sounds: Normal heart sounds, S1 normal and S2 normal. No murmur heard.  Pulmonary:      Effort: Pulmonary effort is normal.      Breath sounds: Normal breath sounds.   Abdominal:      General: Abdomen is flat. Bowel sounds are normal. There is no distension.      Palpations: Abdomen is soft.   Musculoskeletal:         General: Normal range of motion.      Cervical back: Normal range of motion and neck supple.      Right lower leg: No edema.      Left lower leg: No edema.   Skin:     General: Skin is warm and dry.      Capillary Refill: Capillary refill takes less than 2 seconds.   Neurological:      General: No focal deficit present.      Mental Status: He is alert and oriented to person, place, and time.   Psychiatric:         Mood and Affect: Mood normal.         Behavior: Behavior normal.         Thought Content: Thought content normal.         /82   Pulse 68   Resp 14   Ht 5' 11\" (1.803 m)   Wt 207 lb 12.8 oz (94.3 kg)   SpO2 98%   BMI 28.98 kg/m²   Estimated body mass index is 28.98 kg/m² as calculated from the following:    Height as of this encounter: 5' 11\" (1.803 m).    Weight as of this encounter: 207 lb 12.8 oz (94.3 kg).    Medicare Hearing Assessment:   Hearing Screening    Screening Method: Whisper Test  Whisper Test Result: Pass         Visual Acuity:   Right Eye Visual Acuity: Corrected Right Eye Chart Acuity: 20/40   Left Eye Visual Acuity: Corrected Left Eye Chart Acuity: 20/100   Both Eyes Visual Acuity: Corrected Both Eyes Chart Acuity: 20/30   Able To Tolerate Visual Acuity: Yes        Assessment & Plan:   William Lazarus Solomon III is a 69 year old male who presents for a Medicare Assessment.     1. Annual physical exam (Primary)  2. Inflammatory arthritis  Assessment & Plan:  Stable continue to monitor and continue present management   RA (elevated ESR, +synovitis in wrist on MRI) vs PMR, on methotrexate and folic acid  3. Autoimmune disorder (HCC)  Overview:  RA (elevated ESR, +synovitis in wrist on MRI) vs PMR, on methotrexate and folic acid  Assessment & Plan:  Stable, continue present management and continue to monitor for progression per rheumatology  RA (elevated ESR, +synovitis in wrist on MRI) vs PMR, on methotrexate and folic acid  4. Essential hypertension  Overview:  No current meds.   Assessment & Plan:  BP shows poor control with last BP of 146/74. Lifestyle changes, diet, exercise and weight loss were counseled. Medication changes as listed.   3/27/2025: POTASSIUM 4.4; CREATININE 0.98; EGFR 83     5. Mixed hyperlipidemia  Overview:  Atorvastatin 10  Assessment & Plan:  Cholesterol shows Good control. Long term heart-healthy diet and lifestyle discussed and encouraged to reduce risk of cardiovascular disease.  3/27/2025: CHOLESTEROL, TOTAL 146; HDL CHOLESTEROL 47; TRIGLYCERIDES 72; LDL-CHOLESTEROL 84  Cholesterol Meds: atorvastatin Tabs - 20 MG  stable  Continue with current treatment plan   Orders:  -     Atorvastatin Calcium;  Take 1 tablet (20 mg total) by mouth daily.  Dispense: 90 tablet; Refill: 3  6. Elevated PSA, less than 10 ng/ml  Overview:  PSA 4.22 in 12/2023  Assessment & Plan:  12/1/2023: PSA, TOTAL 4.22 (H) stable labs. Continue to monitor and continue present management     Assessment & Plan  Elevated PSA  PSA level decreased from 4.22 ng/mL in December 2023 to 2.7 ng/mL.  - Regular monitoring of PSA levels.    Dyslipidemia  Cholesterol levels improved: total cholesterol 146 mg/dL, LDL 84 mg/dL, HDL 47 mg/dL. Current levels are satisfactory, with LDL reduction favorable. Maintaining LDL below 100 mg/dL prevents arterial cholesterol deposition; further reduction below 70 mg/dL may reverse plaque.  - Continue current dyslipidemia management.    Long-term use of high-risk medication  On methotrexate, refill due April 14th. Last refilled in September for six months.  - Renew methotrexate prescription for one year and send to [facility].    General Health Maintenance  COVID-19 vaccination discussed. He had COVID-19 in October and received a vaccine in the fall. Guidelines suggest vaccination every six months for those over 65, with an updated vaccine expected annually around September. Last colonoscopy showed no polyps; next recommended in four to five years. Weight management discussed, focusing on reducing carbohydrates and avoiding soda. He aims to reduce weight to 185 lbs, having lost 5 lbs in the last month.  - Recommend COVID-19 vaccination in the fall, considering infection and vaccination history.  - Monitor local COVID-19 risk levels to guide vaccination timing.  - Schedule next colonoscopy in four to five years.  - Encourage weight management through diet modifications, focusing on reducing carbohydrates and avoiding soda.    Goals of Care  Power of  for healthcare and advance directives discussed and addressed.  - Ensure power of  for healthcare and advance directives are up to date.  The patient  indicates understanding of these issues and agrees to the plan.  Reinforced healthy diet, lifestyle, and exercise.      Return in about 49 weeks (around 3/9/2026) for AWV with chonic condition follow up.     Dhaval Marie MD, 3/31/2025     Supplementary Documentation:   General Health:  In the past six months, have you lost more than 10 pounds without trying?: 2 - No  Has your appetite been poor?: No  Type of Diet: Balanced  How does the patient maintain a good energy level?: Appropriate Exercise  How would you describe your daily physical activity?: Moderate  How would you describe your current health state?: Good  How do you maintain positive mental well-being?: Social Interaction, Puzzles, Games, Visiting Friends, Visiting Family  On a scale of 0 to 10, with 0 being no pain and 10 being severe pain, what is your pain level?: 0 - (None)  In the past six months, have you experienced urine leakage?: 0-No  At any time do you feel concerned for the safety/well-being of yourself and/or your children, in your home or elsewhere?: No  Have you had any immunizations at another office such as Influenza, Hepatitis B, Tetanus, or Pneumococcal?: Yes    Health Maintenance   Topic Date Due    Zoster Vaccines (3 of 3) 02/05/2020    COVID-19 Vaccine (7 - 2024-25 season) 09/01/2024    Influenza Vaccine (1) 10/01/2024    Annual Physical  12/06/2024    PSA  03/29/2025    Colorectal Cancer Screening  05/08/2029    Annual Depression Screening  Completed    Fall Risk Screening (Annual)  Completed    Pneumococcal Vaccine: 50+ Years  Completed    Meningococcal B Vaccine  Aged Out

## 2025-03-31 NOTE — PROGRESS NOTES
The following individual(s) verbally consented to be recorded using ambient AI listening technology and understand that they can each withdraw their consent to this listening technology at any point by asking the clinician to turn off or pause the recording:    Patient name: William Lazarus Solomon III

## 2025-04-03 DIAGNOSIS — E78.2 MIXED HYPERLIPIDEMIA: ICD-10-CM

## 2025-04-04 RX ORDER — ATORVASTATIN CALCIUM 20 MG/1
20 TABLET, FILM COATED ORAL DAILY
Qty: 90 TABLET | Refills: 3 | Status: SHIPPED | OUTPATIENT
Start: 2025-04-04

## 2025-04-04 NOTE — TELEPHONE ENCOUNTER
Requested Prescriptions     Pending Prescriptions Disp Refills    ATORVASTATIN 20 MG Oral Tab [Pharmacy Med Name: ATORVASTATIN 20MG TABLETS] 90 tablet 3     Sig: TAKE 1 TABLET(20 MG) BY MOUTH DAILY     LOV 3/31/2025     Patient was asked to follow-up in: 49 weeks     Appointment scheduled: 3/9/26    Medication refilled per protocol.

## 2025-05-23 RX ORDER — METHOTREXATE 2.5 MG/1
15 TABLET ORAL
Qty: 74 TABLET | Refills: 1 | Status: SHIPPED | OUTPATIENT
Start: 2025-05-23

## 2025-05-23 NOTE — TELEPHONE ENCOUNTER
Requested Prescriptions     Pending Prescriptions Disp Refills    METHOTREXATE 2.5 MG Oral Tab [Pharmacy Med Name: Methotrexate Sodium 2.5 Mg Tab Teva] 74 tablet 0     Sig: TAKE 6 TABLETS BY MOUTH EVERY 7 DAYS     LOV: 3/19/25  Future Appointments   Date Time Provider Department Center   12/1/2025 11:40 AM Marie Garcia MD [facility]     Labs:     Component      Latest Ref Rng 3/27/2025   WBC      3.8 - 10.8 Thousand/uL 5.8    RBC      4.20 - 5.80 Million/uL 4.80    Hemoglobin      13.2 - 17.1 g/dL 14.7    Hematocrit      38.5 - 50.0 % 45.2    MCV      80.0 - 100.0 fL 94.2    MCH      27.0 - 33.0 pg 30.6    MCHC      32.0 - 36.0 g/dL 32.5    RDW      11.0 - 15.0 % 12.6    Platelet Count      140 - 400 Thousand/uL 362    MPV      7.5 - 12.5 fL 10.4    Neutrophils Absolute      1,500 - 7,800 cells/uL 2,430    Lymphocytes Absolute      850 - 3,900 cells/uL 2,129    Monocytes Absolute      200 - 950 cells/uL 557    Eosinophils Absolute      15 - 500 cells/uL 597 (H)    Basophils Absolute      0 - 200 cells/uL 87    Neutrophils %      % 41.9    Lymphocytes %      % 36.7    Monocytes %      % 9.6    Eosinophils %      % 10.3    Basophils %      % 1.5    Glucose      65 - 99 mg/dL 99    BUN      7 - 25 mg/dL 14    CREATININE      0.70 - 1.35 mg/dL 0.98    EGFR      > OR = 60 mL/min/1.73m2 83    BUN/CREATININE RATIO      6 - 22 (calc) SEE NOTE:    Sodium      135 - 146 mmol/L 138    Potassium      3.5 - 5.3 mmol/L 4.4    Chloride      98 - 110 mmol/L 103    Carbon Dioxide, Total      20 - 32 mmol/L 31    CALCIUM      8.6 - 10.3 mg/dL 9.5    PROTEIN, TOTAL      6.1 - 8.1 g/dL 7.0    Albumin      3.6 - 5.1 g/dL 4.2    Globulin      1.9 - 3.7 g/dL (calc) 2.8    A/G Ratio      1.0 - 2.5 (calc) 1.5    Total Bilirubin      0.2 - 1.2 mg/dL 0.8    Alkaline Phosphatase      35 - 144 U/L 66    AST (SGOT)      10 - 35 U/L 23    ALT (SGPT)      9 - 46 U/L 25       Legend:  (H) High    Instructions    You were seen today for  rheumatoid arthritis  Overall joints are doing really well  You are having some pain in the right fourth finger, we injected it with cortisone  Blood work overall looked good  Continue get blood work at least every 4 months  You can see me in 6 to 9 months

## (undated) DIAGNOSIS — E78.2 MIXED HYPERLIPIDEMIA: Primary | ICD-10-CM

## (undated) DEVICE — SNARE CAPTIFLEX MICRO-OVL OLY

## (undated) DEVICE — FORCEP RADIAL JAW 4

## (undated) DEVICE — 3M™ RED DOT™ MONITORING ELECTRODE WITH FOAM TAPE AND STICKY GEL, 50/BAG, 20/CASE, 72/PLT 2570: Brand: RED DOT™

## (undated) DEVICE — 1200CC GUARDIAN II: Brand: GUARDIAN

## (undated) DEVICE — KIT VLV 5 PC AIR H2O SUCT BX ENDOGATOR CONN

## (undated) DEVICE — ENDOSCOPY PACK - LOWER: Brand: MEDLINE INDUSTRIES, INC.

## (undated) DEVICE — Device: Brand: DEFENDO AIR/WATER/SUCTION AND BIOPSY VALVE

## (undated) DEVICE — 10FT COMBINED O2 DELIVERY/CO2 MONITORING. FILTER WITH MICROSTREAM TYPE LUER: Brand: DUAL ADULT NASAL CANNULA

## (undated) DEVICE — FILTERLINE NASAL ADULT O2/CO2

## (undated) DEVICE — KIT CUSTOM ENDOPROCEDURE STERIS

## (undated) NOTE — LETTER
10/20/21    PATIENT NAME: Frank Yee III   : 1955       Waiver      DATE: 10/20/2021     Dear Patient,    Thank you for choosing [facility] as your health care provider.  Your physician has deemed the following medical servi

## (undated) NOTE — LETTER
08/19/20        [address] [place] [id number]      Dear Abby Lang records indicate that you have outstanding lab work and or testing that was ordered for you and has not yet been completed:  Orders

## (undated) NOTE — LETTER
11/25/20    [address] [id number]      Dear Jadyn Ignacio records indicate that you have outstanding lab work and or testing that was ordered for you and has not yet been completed: with [facility]

## (undated) NOTE — LETTER
[facility]. [address] [facility] [address] [id number] [phone]       PATIENT NAME: Stan Brady III   : 1955       Waiver      DATE: 2023     Dear Patient,    Thank you for choosing [facility] as your health care provider. Your physician has deemed the following medical service(s) necessary. However, your insurance plan may not pay for all of your health care and costs and may deny payment for this service. The fact that your insurance plan does not pay for an item or service does not mean you should not receive it. The purpose of this form is to help you make an informed decision about whether or not you want to receive this service(s) that may not be paid for by your insurance plan. ESTIMATED COST: Shingrix: $215  Administration: $48  Total: $263          I understand that the above mentioned service(s) or supply may not be covered by my insurance company. I agree to be financially responsible for the cost of this service or supply in the event my insurance denies payment as a non-covered benefit.       Patient/Insured Signature: ___________________________________________